# Patient Record
Sex: MALE | Race: WHITE | Employment: PART TIME | URBAN - METROPOLITAN AREA
[De-identification: names, ages, dates, MRNs, and addresses within clinical notes are randomized per-mention and may not be internally consistent; named-entity substitution may affect disease eponyms.]

---
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
## 2024-01-03 ENCOUNTER — TELEPHONE (OUTPATIENT)
Dept: PEDIATRICS | Facility: CLINIC | Age: 19
End: 2024-01-03
Payer: COMMERCIAL

## 2024-01-03 NOTE — TELEPHONE ENCOUNTER
Called Lenin and left message re: Reminder of appointments on 1/4/24.  Left direct call back number for questions or concerns.

## 2024-01-04 ENCOUNTER — VIRTUAL VISIT (OUTPATIENT)
Dept: PEDIATRICS | Facility: CLINIC | Age: 19
End: 2024-01-04
Attending: PEDIATRICS
Payer: COMMERCIAL

## 2024-01-04 DIAGNOSIS — E66.01 SEVERE OBESITY (H): Primary | ICD-10-CM

## 2024-01-04 DIAGNOSIS — E11.9 TYPE 2 DIABETES MELLITUS WITHOUT COMPLICATION, WITHOUT LONG-TERM CURRENT USE OF INSULIN (H): ICD-10-CM

## 2024-01-04 PROCEDURE — 97803 MED NUTRITION INDIV SUBSEQ: CPT | Mod: GT,95 | Performed by: DIETITIAN, REGISTERED

## 2024-01-04 NOTE — PATIENT INSTRUCTIONS
Goals  1) Continue with physical activity (weight training/walking) when you return to college.  2) Continue to prioritize protein with meals. Increase fruit/veg to feel full rather than refined grains.   3) Continue to limit sugary drinks. Aim for 64 fluids per day and separate fluids from meals/snacks.

## 2024-01-04 NOTE — PROGRESS NOTES
"Lenin is a 18 year old who is being evaluated via a billable video visit.      How would you like to obtain your AVS? Mail a copy  If the video visit is dropped, the invitation should be resent by: Text to cell phone: [phone]  Will anyone else be joining your video visit? No    Video-Visit Details    Type of service:  Video Visit   Start Time: 1:11 pm  End Tme: 1:27 pm    Originating Location (pt. Location): Home    Distant Location (provider location):  On-site  Platform used for Video Visit: Well    PATIENT:  Abel Flores  :  2005  KARTHIK:  2024  Medical Nutrition Therapy  Nutrition Reassessment  Abel is a 18 year old year old male seen for 1 month follow-up in Pediatric Bariatric Clinic/Pediatric Weight Management Clinic with severe obesity and type 2 diabetes. Abel was referred by  Dr. Ana Rosa Akins  for ongoing nutrition education and counseling.    RD Visit #: 6    Anthropometrics  Age:  18 year old male   Weight:    Wt Readings from Last 4 Encounters:   23 (!) 153 kg (337 lb 4.9 oz) (>99%, Z= 3.42)*   23 (!) 158.9 kg (350 lb 5 oz) (>99%, Z= 3.52)*   23 (!) 158.9 kg (350 lb 5 oz) (>99%, Z= 3.52)*   10/05/23 (!) 160.3 kg (353 lb 6.4 oz) (>99%, Z= 3.54)*     * Growth percentiles are based on CDC (Boys, 2-20 Years) data.     Height:    Ht Readings from Last 2 Encounters:   23 1.877 m (6' 1.9\") (94%, Z= 1.55)*   23 1.878 m (6' 1.94\") (94%, Z= 1.57)*     * Growth percentiles are based on CDC (Boys, 2-20 Years) data.     Body Mass Index:  There is no height or weight on file to calculate BMI.  Body Mass Index Percentile:  No height and weight on file for this encounter.    ~328-330 pounds most recently at home. Weight fluctuates daily.      Nutrition History  Back at his family home over January term. First semester went well.     Got home for break around Dec 20th. Back to campus on .     Parents are back and forth to the cabin on weekends.     Lenin is " feeling a bit bored at family home and not working much yet. Work will be picking up soon.     Feels that eating has been inconsistent due to lack of structure/going out with friends/holidays.     Easier to get protein in while at parent's home. He will do grocery shopping on weekend. Parents subscribed to [employer] so having this for dinner.     Leftover proteins for other meals. Mostly focusing on protein. Will include vegetables for dinner.     Wake up time is highly variable. Some staying up later due to movie marathon.     Not having a lot of snacks during the day.     When with friends it changes what he's eating a bit. Feels that it changes what he eats but not the volume of food. Ex) 2 slices of pizza.     Drinking a lot of water. Drinks more at his parent's home. Highly accessible. More than 8 glasses per day. Working on  liquids from meals.     MVI and calcium can be hard to remember if he's staying at a friend's etc.      Nutritional Intakes  Breakfast:        Highly variable over winter break   Lunch:             Protein leftover from dinner  PM Snack:       None  Dinner:            Hello [employer] meals  HS Snack:       Not often   Beverages:      Water; zero sugar energy drink        Dining Out  Might occasionally have pizza with friends. 2 slices.      Activity Level  Exploring [employer] in college.    Going for walk while at parent's house over the holidays. Gets out daily. Plans to get back into strength training. Planning to go about 2-3 days per week.      Vitamin and Mineral Supplements & Medications:  Multivitamin/Mineral:  Yes  Calcium with Vitamin D:  Yes  Vitamin B12:  No    Medications/Vitamins/Minerals    Current Outpatient Medications:     CALCIUM-VITAMIN D PO, Take by mouth 2 times daily, Disp: , Rfl:     clindamycin (CLEOCIN T) 1 % external solution, , Disp: , Rfl:     fexofenadine (ALLEGRA) 180 MG tablet, Take 180 mg by mouth daily as needed PRN, Disp: , Rfl:     ibuprofen  (ADVIL/MOTRIN) 200 MG tablet, Take 200-400 mg by mouth, Disp: , Rfl:     Melatonin 10 MG CAPS, Take 10 mg by mouth nightly as needed, Disp: , Rfl:     multivitamin w/minerals (MULTI-VITAMIN) tablet, Take 1 tablet by mouth 2 times daily, Disp: , Rfl:     Sharps Container MISC, 1 Container daily, Disp: 1 each, Rfl: 3    tirzepatide (MOUNJARO) 15 MG/0.5ML pen, Inject 15 mg Subcutaneous every 7 days, Disp: 2 mL, Rfl: 1    Previous Goals & Progress  1) Try to increase fluids to 64 oz per day. - goal met  2) Increase to two MVI and two calcium per day. - goal met  3) Try to increase produce if you're feeling more hungry at a meal rather than muffins, chips, fries. - goal met    Nutrition Diagnosis  Obesity related to excessive energy intake as evidenced by BMI/age >95th %ile    Interventions & Education  Provided written and verbal education on the following:    Consume 3 or 4 meals a day  Eat slowly/Chew Foods Well  64 oz Fluid/day  Sip fluids/avoid straws/ separate from meals  Eat Protein first    Goals  1) Continue with physical activity (weight training/walking) when you return to college.  2) Continue to prioritize protein with meals. Increase fruit/veg to feel full rather than refined grains.   3) Continue to limit sugary drinks. Aim for 64 fluids per day and separate fluids from meals/snacks.     Monitoring/Evaluation  Will continue to monitor progress towards goals and provide education in Pediatric Weight Management.    Spent 16 minutes in consult with patient.

## 2024-01-04 NOTE — LETTER
"2024      RE: Abel Flores  [address]  [place] [address]     Dear Colleague,    Thank you for the opportunity to participate in the care of your patient, Abel Flores, at the [facility] at [facility]. Please see a copy of my visit note below.    Lenin is a 18 year old who is being evaluated via a billable video visit.      How would you like to obtain your AVS? Mail a copy  If the video visit is dropped, the invitation should be resent by: Text to cell phone: [phone]  Will anyone else be joining your video visit? No    Video-Visit Details    Type of service:  Video Visit   Start Time: 1:11 pm  End Tme: 1:27 pm    Originating Location (pt. Location): Home    Distant Location (provider location):  On-site  Platform used for Video Visit: Riley    PATIENT:  Abel Flores  :  2005  KARTHIK:  2024  Medical Nutrition Therapy  Nutrition Reassessment  Abel is a 18 year old year old male seen for 1 month follow-up in Pediatric Bariatric Clinic/Pediatric Weight Management Clinic with severe obesity and type 2 diabetes. Abel was referred by  Dr. Ana Rosa Akins  for ongoing nutrition education and counseling.    RD Visit #: 6    Anthropometrics  Age:  18 year old male   Weight:    Wt Readings from Last 4 Encounters:   23 (!) 153 kg (337 lb 4.9 oz) (>99%, Z= 3.42)*   23 (!) 158.9 kg (350 lb 5 oz) (>99%, Z= 3.52)*   23 (!) 158.9 kg (350 lb 5 oz) (>99%, Z= 3.52)*   10/05/23 (!) 160.3 kg (353 lb 6.4 oz) (>99%, Z= 3.54)*     * Growth percentiles are based on CDC (Boys, 2-20 Years) data.     Height:    Ht Readings from Last 2 Encounters:   23 1.877 m (6' 1.9\") (94%, Z= 1.55)*   23 1.878 m (6' 1.94\") (94%, Z= 1.57)*     * Growth percentiles are based on CDC (Boys, 2-20 Years) data.     Body Mass Index:  There is no height or weight on file to calculate BMI.  Body Mass Index " Percentile:  No height and weight on file for this encounter.    ~328-330 pounds most recently at home. Weight fluctuates daily.      Nutrition History  Back at his family home over January term. First semester went well.     Got home for break around Dec 20th. Back to campus on Jan 20th.     Parents are back and forth to the cabin on weekends.     Lenin is feeling a bit bored at family home and not working much yet. Work will be picking up soon.     Feels that eating has been inconsistent due to lack of structure/going out with friends/holidays.     Easier to get protein in while at parent's home. He will do grocery shopping on weekend. Parents subscribed to [employer] so having this for dinner.     Leftover proteins for other meals. Mostly focusing on protein. Will include vegetables for dinner.     Wake up time is highly variable. Some staying up later due to movie marathon.     Not having a lot of snacks during the day.     When with friends it changes what he's eating a bit. Feels that it changes what he eats but not the volume of food. Ex) 2 slices of pizza.     Drinking a lot of water. Drinks more at his parent's home. Highly accessible. More than 8 glasses per day. Working on  liquids from meals.     MVI and calcium can be hard to remember if he's staying at a friend's etc.      Nutritional Intakes  Breakfast:        Highly variable over winter break   Lunch:             Protein leftover from dinner  PM Snack:       None  Dinner:            [employer] meals  HS Snack:       Not often   Beverages:      Water; zero sugar energy drink        Dining Out  Might occasionally have pizza with friends. 2 slices.      Activity Level  Exploring [employer] in college.    Going for walk while at parent's house over the holidays. Gets out daily. Plans to get back into strength training. Planning to go about 2-3 days per week.      Vitamin and Mineral Supplements & Medications:  Multivitamin/Mineral:   Yes  Calcium with Vitamin D:  Yes  Vitamin B12:  No    Medications/Vitamins/Minerals    Current Outpatient Medications:      CALCIUM-VITAMIN D PO, Take by mouth 2 times daily, Disp: , Rfl:      clindamycin (CLEOCIN T) 1 % external solution, , Disp: , Rfl:      fexofenadine (ALLEGRA) 180 MG tablet, Take 180 mg by mouth daily as needed PRN, Disp: , Rfl:      ibuprofen (ADVIL/MOTRIN) 200 MG tablet, Take 200-400 mg by mouth, Disp: , Rfl:      Melatonin 10 MG CAPS, Take 10 mg by mouth nightly as needed, Disp: , Rfl:      multivitamin w/minerals (MULTI-VITAMIN) tablet, Take 1 tablet by mouth 2 times daily, Disp: , Rfl:      Sharps Container MISC, 1 Container daily, Disp: 1 each, Rfl: 3     tirzepatide (MOUNJARO) 15 MG/0.5ML pen, Inject 15 mg Subcutaneous every 7 days, Disp: 2 mL, Rfl: 1    Previous Goals & Progress  1) Try to increase fluids to 64 oz per day. - goal met  2) Increase to two MVI and two calcium per day. - goal met  3) Try to increase produce if you're feeling more hungry at a meal rather than muffins, chips, fries. - goal met    Nutrition Diagnosis  Obesity related to excessive energy intake as evidenced by BMI/age >95th %ile    Interventions & Education  Provided written and verbal education on the following:    Consume 3 or 4 meals a day  Eat slowly/Chew Foods Well  64 oz Fluid/day  Sip fluids/avoid straws/ separate from meals  Eat Protein first    Goals  1) Continue with physical activity (weight training/walking) when you return to college.  2) Continue to prioritize protein with meals. Increase fruit/veg to feel full rather than refined grains.   3) Continue to limit sugary drinks. Aim for 64 fluids per day and separate fluids from meals/snacks.     Monitoring/Evaluation  Will continue to monitor progress towards goals and provide education in Pediatric Weight Management.    Spent 16 minutes in consult with patient.            Please do not hesitate to contact me if you have any questions/concerns.      Sincerely,       Amena Madera RD

## 2024-01-23 ENCOUNTER — TELEPHONE (OUTPATIENT)
Dept: PEDIATRICS | Facility: CLINIC | Age: 19
End: 2024-01-23
Payer: COMMERCIAL

## 2024-01-23 DIAGNOSIS — K76.0 HEPATIC STEATOSIS: ICD-10-CM

## 2024-01-23 DIAGNOSIS — R74.01 ELEVATED ALT MEASUREMENT: ICD-10-CM

## 2024-01-23 DIAGNOSIS — E66.01 SEVERE OBESITY (H): ICD-10-CM

## 2024-01-23 DIAGNOSIS — E11.9 TYPE 2 DIABETES MELLITUS WITHOUT COMPLICATION, WITHOUT LONG-TERM CURRENT USE OF INSULIN (H): ICD-10-CM

## 2024-01-23 RX ORDER — TIRZEPATIDE 15 MG/.5ML
15 INJECTION, SOLUTION SUBCUTANEOUS
Qty: 2 ML | Refills: 1 | Status: SHIPPED | OUTPATIENT
Start: 2024-01-23 | End: 2024-05-06

## 2024-01-23 NOTE — TELEPHONE ENCOUNTER
[facility] Call Center    Phone Message    May a detailed message be left on voicemail: yes     Reason for Call: Medication Refill Request      Has the patient contacted the pharmacy for the refill? Yes     Name of medication being requested: Mounjaro  tirzepatide (MOUNJARO) 15 MG/0.5ML pen      Provider who prescribed the medication: Mariya Kruse MD      Pharmacy: [facility]/pharmacy #[id number] - [address] (Ph: [phone])      Date medication is needed: 01/25/24       Action Taken: Other: PEDS WM    Travel Screening: Not Applicable

## 2024-02-05 PROBLEM — R29.898 WEAKNESS OF BOTH LOWER EXTREMITIES: Status: ACTIVE | Noted: 2024-02-05

## 2024-02-05 PROBLEM — R53.81 PHYSICAL DECONDITIONING: Status: ACTIVE | Noted: 2024-02-05

## 2024-02-06 ENCOUNTER — TELEPHONE (OUTPATIENT)
Dept: PEDIATRICS | Facility: CLINIC | Age: 19
End: 2024-02-06
Payer: COMMERCIAL

## 2024-02-06 NOTE — PROGRESS NOTES
Date: 2024    PATIENT:  Abel Flores  :          2005  KARTHIK:          2024    Dear Carrol Hitchcock:    I had the pleasure of seeing your patient, Abel Flores, for a visit in the [facility] Pediatric Weight Management Clinic on 2024 at the [facility]. Please see below for my assessment and plan of care.    Intercurrent History:  As you may recall, Lenin is a 18 year old male with a BMI in the severe obesity range (defined as BMI >/ 35 kg/m2) complicated by type 2 diabetes, hepatic steatosis, and elevated BP. Lenin has been previously seen in our Pediatric Weight Management Clinic and is now seeking consultation in our Adolescent Metabolic and Bariatric Surgery Clinic. Lenin has been progressing through our adolescent program and would like to have surgery done in early May so he can finish up college courses but have plenty of time to recover over the summer.      Medications:  - Mounjaro 15 mg weekly - some nausea in the morning sometimes; missed 2 doses recently - noted that lowest weight was 305 lbs and then weight began to increase when off of medication; noticed an increase in appetite when off medication     Activity:  - Going to the gym 2-3x/week; Tues/Thurs - walk 3 miles in the gym or walk on trails outside; if in the gym, also doing some weight training      Social History:   - Lenin is a freshman at [employer]       - Does not drink alcohol, does not smoke tobacco or vape; not smoking THC anymore  - Planning to return home for the summer and resume work at [employer]   - Had an interview today at [employer] for job during the school year      Current Medications:  Current Outpatient Rx   Medication Sig Dispense Refill    CALCIUM-VITAMIN D PO Take by mouth 2 times daily      clindamycin (CLEOCIN T) 1 % external solution       fexofenadine (ALLEGRA) 180 MG tablet Take 180 mg by mouth daily as needed PRN      ibuprofen  "(ADVIL/MOTRIN) 200 MG tablet Take 200-400 mg by mouth      Melatonin 10 MG CAPS Take 10 mg by mouth nightly as needed      multivitamin w/minerals (MULTI-VITAMIN) tablet Take 1 tablet by mouth 2 times daily      Sharps Container MISC 1 Container daily 1 each 3    tirzepatide (MOUNJARO) 15 MG/0.5ML pen Inject 15 mg Subcutaneous every 7 days 2 mL 1       Physical Exam:    Vitals:    B/P:   BP Readings from Last 1 Encounters:   02/08/24 118/88     P:   Pulse Readings from Last 1 Encounters:   02/08/24 77       Measured Weights:  Wt Readings from Last 4 Encounters:   02/08/24 142.3 kg (313 lb 11.4 oz) (>99%, Z= 3.21)*   12/07/23 (!) 153 kg (337 lb 4.9 oz) (>99%, Z= 3.42)*   11/02/23 (!) 158.9 kg (350 lb 5 oz) (>99%, Z= 3.52)*   11/02/23 (!) 158.9 kg (350 lb 5 oz) (>99%, Z= 3.52)*     * Growth percentiles are based on CDC (Boys, 2-20 Years) data.       Height:    Ht Readings from Last 4 Encounters:   02/08/24 1.865 m (6' 1.43\") (91%, Z= 1.37)*   12/07/23 1.877 m (6' 1.9\") (94%, Z= 1.55)*   11/02/23 1.878 m (6' 1.94\") (94%, Z= 1.57)*   11/02/23 1.878 m (6' 1.94\") (94%, Z= 1.57)*     * Growth percentiles are based on CDC (Boys, 2-20 Years) data.     Body Mass Index:  Body mass index is 40.91 kg/m .    Labs:    Results for orders placed or performed in visit on 02/08/24   CBC with platelets [UUC943]     Status: Abnormal   Result Value Ref Range    WBC Count 12.7 (H) 4.0 - 11.0 10e3/uL    RBC Count 5.53 4.40 - 5.90 10e6/uL    Hemoglobin 16.0 13.3 - 17.7 g/dL    Hematocrit 49.3 40.0 - 53.0 %    MCV 89 78 - 100 fL    MCH 28.9 26.5 - 33.0 pg    MCHC 32.5 31.5 - 36.5 g/dL    RDW 13.3 10.0 - 15.0 %    Platelet Count 338 150 - 450 10e3/uL   Comprehensive metabolic panel [LAB17]     Status: Abnormal   Result Value Ref Range    Sodium 141 135 - 145 mmol/L    Potassium 4.7 3.4 - 5.3 mmol/L    Carbon Dioxide (CO2) 28 22 - 29 mmol/L    Anion Gap 10 7 - 15 mmol/L    Urea Nitrogen 13.6 6.0 - 20.0 mg/dL    Creatinine 0.99 0.67 - 1.17 " "mg/dL    GFR Estimate >90 >60 mL/min/1.73m2    Calcium 9.9 8.6 - 10.0 mg/dL    Chloride 103 98 - 107 mmol/L    Glucose 88 70 - 99 mg/dL    Alkaline Phosphatase 87 65 - 260 U/L    AST 70 (H) 0 - 35 U/L     (H) 0 - 50 U/L    Protein Total 8.0 6.4 - 8.3 g/dL    Albumin 4.5 3.5 - 5.2 g/dL    Bilirubin Total 0.8 <=1.2 mg/dL   Hemoglobin A1c [LAB90]     Status: Normal   Result Value Ref Range    Hemoglobin A1C 5.3 <5.7 %   Ferritin [LAB68]     Status: Normal   Result Value Ref Range    Ferritin 82 31 - 409 ng/mL   Protein  random urine     Status: None   Result Value Ref Range    Total Protein Urine mg/dL 18.1   mg/dL    Total Protein Urine mg/mg Creat 0.09 0.00 - 0.20 mg/mg Cr    Creatinine Urine mg/dL 199.0 mg/dL          Assessment:  Abel \"Brando" is a 18 year old with a BMI in the severe obesity range (defined as BMI >/ 35 kg/m2) complicated by type 2 diabetes, hepatic steatosis, and elevated BP. Lenin is now undergoing evaluation for metabolic and bariatric surgery. Lenin is continuing to progress through the metabolic and bariatric surgery program. Lenin continues to progress well through our bariatric program and is now at his pre-surgery weight loss goal. During today's visit, he demonstrated excellent understanding of the bariatric surgery process. We will plan to move forward with getting insurance approval for surgery with a goal of having the procedure done in early May. Labs today show excellent control of diabetes with a Hgb A1c in the normal range at 5.3%. ALT continues to improve.      Abel s current problem list reviewed today includes:    Encounter Diagnoses   Name Primary?    Bariatric surgery status Yes    Type 2 diabetes mellitus without complication, without long-term current use of insulin (H)     Hepatic steatosis     Severe obesity (H)           Care Plan:  Severe Obesity: BMI 40.91 kg/m2   - Bariatric clinic visit #6   - RD and psychology appointments today as part of bariatric clinic   - " Pre-surgery weight loss goal: 340 lbs; weight today: 313 lbs   - Pharmacotherapy: continue Mounjaro 15 mg weekly      - Consultation with Dr. Higgins done 11/2/2023   - Screening labs - last done 1/2022; pre-op labs today     Type 2 Diabetes: Hgb A1c 5.3%; antibody testing done at [facility] - negative    - Continue weight management plan as noted above, including use of Mounjaro    - Due for eye exam - referral sent    - Proteinuria screen done today     Hepatic Steatosis: GI consultation done; ALT down-trending - recommend liver biopsy when Lenin undergoes surgery   - Continue weight management plan as noted above   - Last US: 2/15/2023      We are looking forward to seeing Lenin for a follow up pre-operative visit in 2 months.     Review of the result(s) of each unique test - labs as noted above  Prescription drug management  35 minutes spent by me on the date of the encounter doing review of test results, patient visit, documentation, and discussion with other provider(s)      Thank you for including me in the care of your patient.  Please do not hesitate to call with questions or concerns.    Sincerely,    Ana Rosa Akins MD, MS    American Board of Obesity Medicine Diplomate  Department of Pediatrics  [facility]              CC  Copy to patient  Huong Flroes Frank  [address]  [place] [address]

## 2024-02-06 NOTE — TELEPHONE ENCOUNTER
Called Lenin and left message re: Reminder of appointments on 2/8/24.  Went over new clinic location.  Left direct call back number for questions or concerns.

## 2024-02-07 ENCOUNTER — TELEPHONE (OUTPATIENT)
Dept: PEDIATRICS | Facility: CLINIC | Age: 19
End: 2024-02-07
Payer: COMMERCIAL

## 2024-02-07 NOTE — TELEPHONE ENCOUNTER
[facility] Call Center    Phone Message    May a detailed message be left on voicemail: yes     Reason for Call: Other: Dad is requesting to push back the 2/8/2024 @11:30am appt by 30 minutes. Or switch all appts to virtual.     Please call dad to discuss. Thanks!       Action Taken: Other: Peds WM     Travel Screening: Not Applicable

## 2024-02-07 NOTE — TELEPHONE ENCOUNTER
Verified with Dr. Carolina that it would be okay to have appointment at noon.  She is okay with plan.    Called dad back and left message re: Appointments can be changed for Lenin to start at noon.  Please call back if this will not work.  Left direct call back number.

## 2024-02-08 ENCOUNTER — OFFICE VISIT (OUTPATIENT)
Dept: PEDIATRICS | Facility: CLINIC | Age: 19
End: 2024-02-08
Attending: PEDIATRICS
Payer: COMMERCIAL

## 2024-02-08 ENCOUNTER — OFFICE VISIT (OUTPATIENT)
Dept: PSYCHOLOGY | Facility: CLINIC | Age: 19
End: 2024-02-08
Attending: PEDIATRICS
Payer: COMMERCIAL

## 2024-02-08 VITALS
DIASTOLIC BLOOD PRESSURE: 88 MMHG | WEIGHT: 313.71 LBS | SYSTOLIC BLOOD PRESSURE: 118 MMHG | HEIGHT: 73 IN | HEART RATE: 77 BPM | BODY MASS INDEX: 41.58 KG/M2

## 2024-02-08 DIAGNOSIS — E66.01 SEVERE OBESITY (H): Primary | ICD-10-CM

## 2024-02-08 DIAGNOSIS — E66.01 SEVERE OBESITY (H): ICD-10-CM

## 2024-02-08 DIAGNOSIS — Z98.84 BARIATRIC SURGERY STATUS: Primary | ICD-10-CM

## 2024-02-08 DIAGNOSIS — K76.0 HEPATIC STEATOSIS: ICD-10-CM

## 2024-02-08 DIAGNOSIS — E11.9 TYPE 2 DIABETES MELLITUS WITHOUT COMPLICATION, WITHOUT LONG-TERM CURRENT USE OF INSULIN (H): ICD-10-CM

## 2024-02-08 LAB
ALBUMIN MFR UR ELPH: 18.1 MG/DL
ALBUMIN SERPL BCG-MCNC: 4.5 G/DL (ref 3.5–5.2)
ALP SERPL-CCNC: 87 U/L (ref 65–260)
ALT SERPL W P-5'-P-CCNC: 114 U/L (ref 0–50)
ANION GAP SERPL CALCULATED.3IONS-SCNC: 10 MMOL/L (ref 7–15)
AST SERPL W P-5'-P-CCNC: 70 U/L (ref 0–35)
BILIRUB SERPL-MCNC: 0.8 MG/DL
BUN SERPL-MCNC: 13.6 MG/DL (ref 6–20)
CALCIUM SERPL-MCNC: 9.9 MG/DL (ref 8.6–10)
CHLORIDE SERPL-SCNC: 103 MMOL/L (ref 98–107)
CREAT SERPL-MCNC: 0.99 MG/DL (ref 0.67–1.17)
CREAT UR-MCNC: 199 MG/DL
DEPRECATED HCO3 PLAS-SCNC: 28 MMOL/L (ref 22–29)
EGFRCR SERPLBLD CKD-EPI 2021: >90 ML/MIN/1.73M2
ERYTHROCYTE [DISTWIDTH] IN BLOOD BY AUTOMATED COUNT: 13.3 % (ref 10–15)
FERRITIN SERPL-MCNC: 82 NG/ML (ref 31–409)
GLUCOSE SERPL-MCNC: 88 MG/DL (ref 70–99)
HBA1C MFR BLD: 5.3 %
HCT VFR BLD AUTO: 49.3 % (ref 40–53)
HGB BLD-MCNC: 16 G/DL (ref 13.3–17.7)
MCH RBC QN AUTO: 28.9 PG (ref 26.5–33)
MCHC RBC AUTO-ENTMCNC: 32.5 G/DL (ref 31.5–36.5)
MCV RBC AUTO: 89 FL (ref 78–100)
PLATELET # BLD AUTO: 338 10E3/UL (ref 150–450)
POTASSIUM SERPL-SCNC: 4.7 MMOL/L (ref 3.4–5.3)
PROT SERPL-MCNC: 8 G/DL (ref 6.4–8.3)
PROT/CREAT 24H UR: 0.09 MG/MG CR (ref 0–0.2)
RBC # BLD AUTO: 5.53 10E6/UL (ref 4.4–5.9)
SODIUM SERPL-SCNC: 141 MMOL/L (ref 135–145)
WBC # BLD AUTO: 12.7 10E3/UL (ref 4–11)

## 2024-02-08 PROCEDURE — 85014 HEMATOCRIT: CPT | Performed by: PEDIATRICS

## 2024-02-08 PROCEDURE — 83036 HEMOGLOBIN GLYCOSYLATED A1C: CPT | Performed by: PEDIATRICS

## 2024-02-08 PROCEDURE — 82728 ASSAY OF FERRITIN: CPT | Performed by: PEDIATRICS

## 2024-02-08 PROCEDURE — 99214 OFFICE O/P EST MOD 30 MIN: CPT | Performed by: PEDIATRICS

## 2024-02-08 PROCEDURE — 36415 COLL VENOUS BLD VENIPUNCTURE: CPT | Performed by: PEDIATRICS

## 2024-02-08 PROCEDURE — 96158 HLTH BHV IVNTJ INDIV 1ST 30: CPT | Performed by: PSYCHOLOGIST

## 2024-02-08 PROCEDURE — 80053 COMPREHEN METABOLIC PANEL: CPT | Performed by: PEDIATRICS

## 2024-02-08 PROCEDURE — 83970 ASSAY OF PARATHORMONE: CPT | Performed by: PEDIATRICS

## 2024-02-08 PROCEDURE — 82607 VITAMIN B-12: CPT | Performed by: PEDIATRICS

## 2024-02-08 PROCEDURE — 82306 VITAMIN D 25 HYDROXY: CPT | Performed by: PEDIATRICS

## 2024-02-08 PROCEDURE — 84156 ASSAY OF PROTEIN URINE: CPT | Performed by: PEDIATRICS

## 2024-02-08 ASSESSMENT — PAIN SCALES - GENERAL: PAINLEVEL: NO PAIN (0)

## 2024-02-08 NOTE — PROGRESS NOTES
"Pediatric Psychology Progress Note    Start time: 11:50am  Stop time: 12:20pm  Service:  [id number] - Health behavior intervention, individual, initial 30 minutes     Diagnosis:   Encounter Diagnosis   Name Primary?    Severe obesity (H) Yes         Subjective: Abel \"Lenin\" REKHA Flores is a 18 year old male who was referred as a part of the metabolic and bariatric surgery program. He presents with severe obesity (BMI >35 kg/m2) complicated by diabetes mellitus (Type 2), hepatic steatosis, and elevated BP. Preferred pronouns are he/him.     Objective: Spent session with Lenin getting updates and discussing surgery timing and plan. He is back to school for 2nd semester; no longer has a roommate. Socially he describes some improvement in getting out more. Classes are going ok, though he is falling behind in assignments with one class. Lenin had further weight  reduction today but described that weight was up a bit from a prior weight he saw, so he had mixed feelings. He was unable to get Mounjaro for a bit so that contributed to mixed feelings - I.e., knows it helps and he needs it but feels concerned with how not having it \"reverses\" progress. Stress and anxiety are reportedly manageable to him right now. He is planning on surgery in early May right when school is done; we discussed meeting on more time in April for pre-surgical session.     Assessment: Lenin was appropriately dressed and well-groomed for the appointment. Attitude was cooperative and responses to questions were meaningful and clear. Mood was positive and affect was congruent to mood. Thought process was clear and logical.     Plan: Follow up during April team visit.     Patrizia Carolina, PhD, LP, BCBA-D    of Pediatrics   Board Certified Behavior Analyst-Doctoral   Department of Pediatrics   *no letter     The author of this note documented a reason for not sharing it with the patient.    "

## 2024-02-08 NOTE — LETTER
"2024      RE: Abel Flores  [address]  [place] [address]     Dear Colleague,    Thank you for the opportunity to participate in the care of your patient, Abel Flores, at the [facility] PEDIATRIC SPECIALTY CLINIC at [facility]. Please see a copy of my visit note below.    PATIENT:  Abel Flores  :  2005  KARTHIK:  2024  Medical Nutrition Therapy    GOALS  Continue aiming for 64 ounces fluid per day. Avoid straws/carbonation/caffeine  Continue to include protein with each meal - daily goal 65 grams per day   Continue with MVI with Iron and calcium supplements (2 each daily - take MVI at a separate time from calcium to increase absorption)         Nutrition Reassessment  Abel is a 18 year old year old male who presents to Pediatric Bariatric Clinic/Pediatric Weight Management Clinic with severe obesity and type 2 diabetes. Abel was referred by Dr. Ana Rosa Akins for nutrition education and counseling, accompanied by father.    RD Visit #: 7    Anthropometrics  Wt Readings from Last 4 Encounters:   24 142.3 kg (313 lb 11.4 oz) (>99%, Z= 3.21)*   23 (!) 153 kg (337 lb 4.9 oz) (>99%, Z= 3.42)*   23 (!) 158.9 kg (350 lb 5 oz) (>99%, Z= 3.52)*   23 (!) 158.9 kg (350 lb 5 oz) (>99%, Z= 3.52)*     * Growth percentiles are based on CDC (Boys, 2-20 Years) data.     Ht Readings from Last 2 Encounters:   24 1.865 m (6' 1.43\") (91%, Z= 1.37)*   23 1.877 m (6' 1.9\") (94%, Z= 1.55)*     * Growth percentiles are based on CDC (Boys, 2-20 Years) data.     Estimated body mass index is 40.91 kg/m  as calculated from the following:    Height as of an earlier encounter on 24: 1.865 m (6' 1.43\").    Weight as of an earlier encounter on 24: 142.3 kg (313 lb 11.4 oz).    Nutrition History  Using larger water bottle. 1/2 gallon water and 1 SF energy drink. Using a lower caffeine one compared to " previous.     Breakfast - chicken sandwich. No sides in the morning. Can be more nauseous in the am.     Around noon or one. Cafeteria is open now. Has a chicken sandwich or burger. Small portion of fries. Melons/pears and broccoli and carrots. Sometimes has a slice of pizza but that's not often.     Mostly no snacking after dinner.     Other than water and SF energy drinks no other beverages.     Sleep is going pretty well. Sleeping 7-8 hours each night.     Water is between meals.    Dinner timing varies pending hunger. 6-8 pm or even later if not hungry. Feels that eating is more of a necessity versus for pleasure. Used to have more bored eating.      Nutritional Intakes  Breakfast:        Chicken sandwich   Lunch:             Chicken sandwich or burger with small amount of fries, fruit and veg  PM Snack:       None  Dinner:            Same as lunch  HS Snack:       Not often   Beverages:      Water (1/2 gallon); zero sugar energy drink (one per day at most)       Dining Out  Might occasionally have pizza with friends. 1 slice.      Activity Level  Lenin is walking more on campus.      Vitamin and Mineral Supplements & Medications:  Multivitamin/Mineral:  Yes  Calcium with Vitamin D:  Yes  Vitamin B12:  No    Previous Goals & Progress  1) Continue with physical activity (weight training/walking) when you return to college. - doing more walking  2) Continue to prioritize protein with meals. Increase fruit/veg to feel full rather than refined grains. - goal met  3) Continue to limit sugary drinks. Aim for 64 fluids per day and separate fluids from meals/snacks. - goal met    Medications/Vitamins/Minerals    Current Outpatient Medications:      CALCIUM-VITAMIN D PO, Take by mouth 2 times daily, Disp: , Rfl:      clindamycin (CLEOCIN T) 1 % external solution, , Disp: , Rfl:      fexofenadine (ALLEGRA) 180 MG tablet, Take 180 mg by mouth daily as needed PRN, Disp: , Rfl:      ibuprofen (ADVIL/MOTRIN) 200 MG tablet,  Take 200-400 mg by mouth, Disp: , Rfl:      Melatonin 10 MG CAPS, Take 10 mg by mouth nightly as needed, Disp: , Rfl:      multivitamin w/minerals (MULTI-VITAMIN) tablet, Take 1 tablet by mouth 2 times daily, Disp: , Rfl:      Sharps Container MISC, 1 Container daily, Disp: 1 each, Rfl: 3     tirzepatide (MOUNJARO) 15 MG/0.5ML pen, Inject 15 mg Subcutaneous every 7 days, Disp: 2 mL, Rfl: 1    Nutrition Diagnosis  Obesity related to excessive energy intake as evidenced by BMI/age >95th %ile    Interventions & Education  Provided written and verbal education on the following:    Healthy meals/cooking  Healthy beverages  Consume 3 or 4 meals a day  64 oz Fluid/day  Eliminate caffeinated/carbonated beverages    Monitoring/Evaluation  Will continue to monitor progress towards goals and provide education in Pediatric Weight Management.    Spent 15 minutes in consult with patient.       Please do not hesitate to contact me if you have any questions/concerns.     Sincerely,       Amena Madera RD

## 2024-02-08 NOTE — LETTER
"2/8/2024      RE: Abel Flores  [address]  [place] [address]     Dear Colleague,    Thank you for the opportunity to participate in the care of your patient, Abel Flores, at the [facility] PEDIATRIC SPECIALTY CLINIC at [facility]. Please see a copy of my visit note below.    Pediatric Psychology Progress Note    Start time: 11:50am  Stop time: 12:20pm  Service:  [id number] - Health behavior intervention, individual, initial 30 minutes     Diagnosis:   Encounter Diagnosis   Name Primary?     Severe obesity (H) Yes         Subjective: Abel \"Lenin\" REKHA Flores is a 18 year old male who was referred as a part of the metabolic and bariatric surgery program. He presents with severe obesity (BMI >35 kg/m2) complicated by diabetes mellitus (Type 2), hepatic steatosis, and elevated BP. Preferred pronouns are he/him.     Objective: Spent session with Lenin getting updates and discussing surgery timing and plan. He is back to school for 2nd semester; no longer has a roommate. Socially he describes some improvement in getting out more. Classes are going ok, though he is falling behind in assignments with one class. Lenin had further weight  reduction today but described that weight was up a bit from a prior weight he saw, so he had mixed feelings. He was unable to get Mounjaro for a bit so that contributed to mixed feelings - I.e., knows it helps and he needs it but feels concerned with how not having it \"reverses\" progress. Stress and anxiety are reportedly manageable to him right now. He is planning on surgery in early May right when school is done; we discussed meeting on more time in April for pre-surgical session.     Assessment: Lenin was appropriately dressed and well-groomed for the appointment. Attitude was cooperative and responses to questions were meaningful and clear. Mood was positive and affect was congruent to mood. Thought process was " clear and logical.     Plan: Follow up during April team visit.     Patrizia Carolina, PhD, LP, BCBA-D    of Pediatrics   Board Certified Behavior Analyst-Doctoral   Department of Pediatrics   *no letter     The author of this note documented a reason for not sharing it with the patient.      Please do not hesitate to contact me if you have any questions/concerns.     Sincerely,       Patrizia Carolina, VARSHA, PhD LP

## 2024-02-08 NOTE — LETTER
2024      RE: Abel Flores  [address]  [place] [address]     Dear Colleague,    Thank you for the opportunity to participate in the care of your patient, Abel Flores, at the [facility] PEDIATRIC SPECIALTY CLINIC at [facility]. Please see a copy of my visit note below.          Date: 2024    PATIENT:  Abel Flores  :          2005  KARTHIK:          2024    Dear Carrol Hitchcock:    I had the pleasure of seeing your patient, Abel Flores, for a visit in the [facility] Pediatric Weight Management Clinic on 2024 at the [facility]. Please see below for my assessment and plan of care.    Intercurrent History:  As you may recall, Lenin is a 18 year old male with a BMI in the severe obesity range (defined as BMI >/ 35 kg/m2) complicated by type 2 diabetes, hepatic steatosis, and elevated BP. Lenin has been previously seen in our Pediatric Weight Management Clinic and is now seeking consultation in our Adolescent Metabolic and Bariatric Surgery Clinic. Lenin has been progressing through our adolescent program and would like to have surgery done in early May so he can finish up college courses but have plenty of time to recover over the summer.      Medications:  - Mounjaro 15 mg weekly - some nausea in the morning sometimes; missed 2 doses recently - noted that lowest weight was 305 lbs and then weight began to increase when off of medication; noticed an increase in appetite when off medication     Activity:  - Going to the gym 2-3x/week; Tues/Thurs - walk 3 miles in the gym or walk on trails outside; if in the gym, also doing some weight training      Social History:   - Lenin is a freshman at [employer]       - Does not drink alcohol, does not smoke tobacco or vape; not smoking THC anymore  - Planning to return home for the summer and resume work at  "[employer]   - Had an interview today at [employer] for job during the school year      Current Medications:  Current Outpatient Rx   Medication Sig Dispense Refill     CALCIUM-VITAMIN D PO Take by mouth 2 times daily       clindamycin (CLEOCIN T) 1 % external solution        fexofenadine (ALLEGRA) 180 MG tablet Take 180 mg by mouth daily as needed PRN       ibuprofen (ADVIL/MOTRIN) 200 MG tablet Take 200-400 mg by mouth       Melatonin 10 MG CAPS Take 10 mg by mouth nightly as needed       multivitamin w/minerals (MULTI-VITAMIN) tablet Take 1 tablet by mouth 2 times daily       Sharps Container MISC 1 Container daily 1 each 3     tirzepatide (MOUNJARO) 15 MG/0.5ML pen Inject 15 mg Subcutaneous every 7 days 2 mL 1       Physical Exam:    Vitals:    B/P:   BP Readings from Last 1 Encounters:   02/08/24 118/88     P:   Pulse Readings from Last 1 Encounters:   02/08/24 77       Measured Weights:  Wt Readings from Last 4 Encounters:   02/08/24 142.3 kg (313 lb 11.4 oz) (>99%, Z= 3.21)*   12/07/23 (!) 153 kg (337 lb 4.9 oz) (>99%, Z= 3.42)*   11/02/23 (!) 158.9 kg (350 lb 5 oz) (>99%, Z= 3.52)*   11/02/23 (!) 158.9 kg (350 lb 5 oz) (>99%, Z= 3.52)*     * Growth percentiles are based on CDC (Boys, 2-20 Years) data.       Height:    Ht Readings from Last 4 Encounters:   02/08/24 1.865 m (6' 1.43\") (91%, Z= 1.37)*   12/07/23 1.877 m (6' 1.9\") (94%, Z= 1.55)*   11/02/23 1.878 m (6' 1.94\") (94%, Z= 1.57)*   11/02/23 1.878 m (6' 1.94\") (94%, Z= 1.57)*     * Growth percentiles are based on CDC (Boys, 2-20 Years) data.     Body Mass Index:  Body mass index is 40.91 kg/m .    Labs:    Results for orders placed or performed in visit on 02/08/24   CBC with platelets [EIO036]     Status: Abnormal   Result Value Ref Range    WBC Count 12.7 (H) 4.0 - 11.0 10e3/uL    RBC Count 5.53 4.40 - 5.90 10e6/uL    Hemoglobin 16.0 13.3 - 17.7 g/dL    Hematocrit 49.3 40.0 - 53.0 %    MCV 89 78 - 100 fL    MCH 28.9 26.5 - 33.0 pg    MCHC 32.5 31.5 - 36.5 " "g/dL    RDW 13.3 10.0 - 15.0 %    Platelet Count 338 150 - 450 10e3/uL   Comprehensive metabolic panel [LAB17]     Status: Abnormal   Result Value Ref Range    Sodium 141 135 - 145 mmol/L    Potassium 4.7 3.4 - 5.3 mmol/L    Carbon Dioxide (CO2) 28 22 - 29 mmol/L    Anion Gap 10 7 - 15 mmol/L    Urea Nitrogen 13.6 6.0 - 20.0 mg/dL    Creatinine 0.99 0.67 - 1.17 mg/dL    GFR Estimate >90 >60 mL/min/1.73m2    Calcium 9.9 8.6 - 10.0 mg/dL    Chloride 103 98 - 107 mmol/L    Glucose 88 70 - 99 mg/dL    Alkaline Phosphatase 87 65 - 260 U/L    AST 70 (H) 0 - 35 U/L     (H) 0 - 50 U/L    Protein Total 8.0 6.4 - 8.3 g/dL    Albumin 4.5 3.5 - 5.2 g/dL    Bilirubin Total 0.8 <=1.2 mg/dL   Hemoglobin A1c [LAB90]     Status: Normal   Result Value Ref Range    Hemoglobin A1C 5.3 <5.7 %   Ferritin [LAB68]     Status: Normal   Result Value Ref Range    Ferritin 82 31 - 409 ng/mL   Protein  random urine     Status: None   Result Value Ref Range    Total Protein Urine mg/dL 18.1   mg/dL    Total Protein Urine mg/mg Creat 0.09 0.00 - 0.20 mg/mg Cr    Creatinine Urine mg/dL 199.0 mg/dL          Assessment:  Abel \"Brando" is a 18 year old with a BMI in the severe obesity range (defined as BMI >/ 35 kg/m2) complicated by type 2 diabetes, hepatic steatosis, and elevated BP. Lenin is now undergoing evaluation for metabolic and bariatric surgery. Lenin is continuing to progress through the metabolic and bariatric surgery program. Lenin continues to progress well through our bariatric program and is now at his pre-surgery weight loss goal. During today's visit, he demonstrated excellent understanding of the bariatric surgery process. We will plan to move forward with getting insurance approval for surgery with a goal of having the procedure done in early May. Labs today show excellent control of diabetes with a Hgb A1c in the normal range at 5.3%. ALT continues to improve.      Abel s current problem list reviewed today " includes:    Encounter Diagnoses   Name Primary?     Bariatric surgery status Yes     Type 2 diabetes mellitus without complication, without long-term current use of insulin (H)      Hepatic steatosis      Severe obesity (H)           Care Plan:  Severe Obesity: BMI 40.91 kg/m2   - Bariatric clinic visit #6   - RD and psychology appointments today as part of bariatric clinic   - Pre-surgery weight loss goal: 340 lbs; weight today: 313 lbs   - Pharmacotherapy: continue Mounjaro 15 mg weekly      - Consultation with Dr. Higgins done 11/2/2023   - Screening labs - last done 1/2022; pre-op labs today     Type 2 Diabetes: Hgb A1c 5.3%; [facility]    - Continue weight management plan as noted above, including use of Mounjaro    - Due for eye exam - referral sent    - Proteinuria screen done today     Hepatic Steatosis: GI consultation done; ALT down-trending - recommend liver biopsy when Lenin undergoes surgery   - Continue weight management plan as noted above   - Last US: 2/15/2023      We are looking forward to seeing Lenin for a follow up pre-operative visit in 2 months.     Review of the result(s) of each unique test - labs as noted above  Prescription drug management  35 minutes spent by me on the date of the encounter doing review of test results, patient visit, documentation, and discussion with other provider(s)      Thank you for including me in the care of your patient.  Please do not hesitate to call with questions or concerns.    Sincerely,    Ana Rosa Akins MD, MS    American Board of Obesity Medicine Diplomate  Department of Pediatrics  [facility]              CC  Copy to patient  MarkHuongAmauri Coe  [address]  [place] [address]      Please do not hesitate to contact me if you have any questions/concerns.     Sincerely,       Ana Rosa Akins MD

## 2024-02-08 NOTE — PROGRESS NOTES
"PATIENT:  Abel Flores  :  2005  KARTHIK:  2024  Medical Nutrition Therapy    GOALS  Continue aiming for 64 ounces fluid per day. Avoid straws/carbonation/caffeine  Continue to include protein with each meal - daily goal 65 grams per day   Continue with MVI with Iron and calcium supplements (2 each daily - take MVI at a separate time from calcium to increase absorption)         Nutrition Reassessment  Abel is a 18 year old year old male who presents to Pediatric Bariatric Clinic/Pediatric Weight Management Clinic with severe obesity and type 2 diabetes. Abel was referred by Dr. Ana Rosa Akins for nutrition education and counseling, accompanied by father.    RD Visit #: 7    Anthropometrics  Wt Readings from Last 4 Encounters:   24 142.3 kg (313 lb 11.4 oz) (>99%, Z= 3.21)*   23 (!) 153 kg (337 lb 4.9 oz) (>99%, Z= 3.42)*   23 (!) 158.9 kg (350 lb 5 oz) (>99%, Z= 3.52)*   23 (!) 158.9 kg (350 lb 5 oz) (>99%, Z= 3.52)*     * Growth percentiles are based on CDC (Boys, 2-20 Years) data.     Ht Readings from Last 2 Encounters:   24 1.865 m (6' 1.43\") (91%, Z= 1.37)*   23 1.877 m (6' 1.9\") (94%, Z= 1.55)*     * Growth percentiles are based on CDC (Boys, 2-20 Years) data.     Estimated body mass index is 40.91 kg/m  as calculated from the following:    Height as of an earlier encounter on 24: 1.865 m (6' 1.43\").    Weight as of an earlier encounter on 24: 142.3 kg (313 lb 11.4 oz).    Nutrition History  Using larger water bottle. 1/2 gallon water and 1 SF energy drink. Using a lower caffeine one compared to previous.     Breakfast - chicken sandwich. No sides in the morning. Can be more nauseous in the am.     Around noon or one. Cafeteria is open now. Has a chicken sandwich or burger. Small portion of fries. Melons/pears and broccoli and carrots. Sometimes has a slice of pizza but that's not often.     Mostly no snacking after dinner.     Other than water and " SF energy drinks no other beverages.     Sleep is going pretty well. Sleeping 7-8 hours each night.     Water is between meals.    Dinner timing varies pending hunger. 6-8 pm or even later if not hungry. Feels that eating is more of a necessity versus for pleasure. Used to have more bored eating.      Nutritional Intakes  Breakfast:        Chicken sandwich   Lunch:             Chicken sandwich or burger with small amount of fries, fruit and veg  PM Snack:       None  Dinner:            Same as lunch  HS Snack:       Not often   Beverages:      Water (1/2 gallon); zero sugar energy drink (one per day at most)       Dining Out  Might occasionally have pizza with friends. 1 slice.      Activity Level  Lenin is walking more on campus.      Vitamin and Mineral Supplements & Medications:  Multivitamin/Mineral:  Yes  Calcium with Vitamin D:  Yes  Vitamin B12:  No    Previous Goals & Progress  1) Continue with physical activity (weight training/walking) when you return to college. - doing more walking  2) Continue to prioritize protein with meals. Increase fruit/veg to feel full rather than refined grains. - goal met  3) Continue to limit sugary drinks. Aim for 64 fluids per day and separate fluids from meals/snacks. - goal met    Medications/Vitamins/Minerals    Current Outpatient Medications:     CALCIUM-VITAMIN D PO, Take by mouth 2 times daily, Disp: , Rfl:     clindamycin (CLEOCIN T) 1 % external solution, , Disp: , Rfl:     fexofenadine (ALLEGRA) 180 MG tablet, Take 180 mg by mouth daily as needed PRN, Disp: , Rfl:     ibuprofen (ADVIL/MOTRIN) 200 MG tablet, Take 200-400 mg by mouth, Disp: , Rfl:     Melatonin 10 MG CAPS, Take 10 mg by mouth nightly as needed, Disp: , Rfl:     multivitamin w/minerals (MULTI-VITAMIN) tablet, Take 1 tablet by mouth 2 times daily, Disp: , Rfl:     Sharps Container MISC, 1 Container daily, Disp: 1 each, Rfl: 3    tirzepatide (MOUNJARO) 15 MG/0.5ML pen, Inject 15 mg Subcutaneous every 7  days, Disp: 2 mL, Rfl: 1    Nutrition Diagnosis  Obesity related to excessive energy intake as evidenced by BMI/age >95th %ile    Interventions & Education  Provided written and verbal education on the following:    Healthy meals/cooking  Healthy beverages  Consume 3 or 4 meals a day  64 oz Fluid/day  Eliminate caffeinated/carbonated beverages    Monitoring/Evaluation  Will continue to monitor progress towards goals and provide education in Pediatric Weight Management.    Spent 15 minutes in consult with patient.

## 2024-02-08 NOTE — NURSING NOTE
"[facility] [[id number]]  Chief Complaint   Patient presents with    RECHECK     Initial /88   Pulse 77   Ht 6' 1.43\" (186.5 cm)   Wt 313 lb 11.4 oz (142.3 kg)   BMI 40.91 kg/m   Estimated body mass index is 40.91 kg/m  as calculated from the following:    Height as of this encounter: 6' 1.43\" (186.5 cm).    Weight as of this encounter: 313 lb 11.4 oz (142.3 kg).  Medication Reconciliation: complete    Does the patient need any medication refills today? No    Does the patient/parent need MyChart or Proxy acces today? No    Does the patient want a flu shot today? No          "

## 2024-02-09 LAB
PTH-INTACT SERPL-MCNC: 46 PG/ML (ref 15–65)
VIT B12 SERPL-MCNC: 371 PG/ML (ref 232–1245)
VIT D+METAB SERPL-MCNC: 17 NG/ML (ref 20–50)

## 2024-02-19 ENCOUNTER — TELEPHONE (OUTPATIENT)
Dept: ENDOCRINOLOGY | Facility: CLINIC | Age: 19
End: 2024-02-19
Payer: COMMERCIAL

## 2024-02-19 NOTE — TELEPHONE ENCOUNTER
Called patient to discuss surgery date for a sleeve with Dr. Higgins. Lenin's father picked up the phone. Lenin said he wanted the surgery as early in May as possible, has finals the first week of May. Decided on May 14 for the sleeve. PAC appointment made.

## 2024-02-20 NOTE — TELEPHONE ENCOUNTER
FUTURE VISIT INFORMATION      SURGERY INFORMATION:  Date: TBD- sleeve with Dr. Higgins     RECORDS REQUESTED FROM:       Primary Care Provider: Amena Donis MD  - Ade

## 2024-02-21 ENCOUNTER — DOCUMENTATION ONLY (OUTPATIENT)
Dept: PSYCHOLOGY | Facility: CLINIC | Age: 19
End: 2024-02-21
Payer: COMMERCIAL

## 2024-02-21 DIAGNOSIS — E66.01 SEVERE OBESITY (H): Primary | ICD-10-CM

## 2024-02-21 PROCEDURE — 99207 PR NO CHARGE LOS: CPT | Performed by: PSYCHOLOGIST

## 2024-02-21 NOTE — PROGRESS NOTES
Pediatric Psychology Contact Note      To whom it may concern:    Abel Flores is a 18-year-old male (He/Him), who was referred for psychological services as part of metabolic and bariatric surgery program. He presents with severe obesity (BMI >35 kg/m2) complicated by diabetes mellitus (Type 2), hepatic steatosis, and elevated BP. The following is a preoperative psychological evaluation.    Reason for Pursuing Surgery: Lenin is currently considering metabolic and bariatric surgery as a means to control his weight status. He reported that he made this decision after learning more about the weight-loss process and being told he could weigh under 300lbs. He acknowledged that initially he was not considering surgery due to pride. However, as he continues to develop medical comorbidities along with making lifestyle changes and acquiring new knowledge of weight. He has been more accepting and knowledgeable about surgery.     History of Present Illness: Lenin has a BMI in the severely obese range (defined as BMI >/35kg/m2) complicated by type 2 diabetes, hepatic steatosis, and elevated BP. He has tried a variety of strategies including medication management. Lenin endorsed the following eating behaviors with the weight management team in January 2022: eats when bored, eats to cope with negative emotions, eats large amounts when not hungry, overeats in evening hours (doing this less now), grazes and gets second portions at meals. At the intake, primary contributors to Lenin's weight status included: strong hunger which may be due to a disorder in satiety regulation, mental health barriers, specifically depression or anxiety, diabetes and changes in eating/activity patterns in the context of the COVID-19 pandemic.      Family and Social History: Lenin currently lives on campus at [facility] with a roommate. However, when he is not in school, he lives with his mother and father in [place]. He also has two older  sisters. Lenin described his father as supportive, but noted that historically, he has been a less authoritative parent. He described his relationship with his mother as strenuous because they are often annoyed and bickering with each other. Socially, Lenin reported having several close friends and enjoying exploring his new environment, playing video games, and pursuing hobbies. He described struggling with being social with other people, and often having extroverted friends help introduce him to others. Lenin is not in a romantic relationship.     School/Employment History: This fall, Lenin began his first year at [employer]. He is majoring in professional communications and emerging media. He reported that as he begins college, he intends to change his academic habits. Historically, Lenin described himself to be a slacker. He noted that he performed well in the classroom. However, he struggled to complete homework. Lenin attributed his habits to not wanting to complete the hard work. As a result of his efforts, Lenin was able to pass high school earning C's and D's.      Birth/Developmental History: Lenin's birth and developmental history are largely unknown.     Medical/Psychiatric History: In addition to carrying extra weight, Lenin is diagnosed with type 2 diabetes, hepatic steatosis, and elevated BP. Lenin's current medications include fexofenadine and tirzepatide.      In terms of mental health treatment, Lenin has a history of engaging in psychotherapy during his etelvina and senior year of high school. He reported a history of symptoms of depression that included passive suicidal ideation (i.e., what if I did not exist). Lenin denied current suicidal ideation, or a history of plans or attempts. During therapy, Lenin reported learning coping strategies to help him manage stressful situations. In addition to therapy, Lenin was previously prescribed Wellbutrin. Lenin stopped taking his Wellbutrin after therapy and continues to  not take it, as he feels as though he has the skills to cope with his challenges.     Substance Abuse: Lenin reported a history of using marijuana socially. However, he reported that he has stopped using marijuana. Lenin denied use of other substances.     Legal History: Lenin denied current legal involvement.     EVALUATION DATA AND FINDINGS (completed in Sept 2023)  Mental Status: Lenin was appropriately dressed and well-groomed for the appointment. Attitude was cooperative and responses to questions were meaningful and clear. Moreover, they were elaborative and detailed. Lenin was oriented to time, place, person, and situation. Mood was positive and affect was congruent to mood. Thought process was clear and logical    Behavioral and Emotional Functioning  Behavior Assessment System for Children, Third Edition, Self-Report (BASC-3)  The BASC-3 asks parents and youth to rate the frequency and intensity of problem behaviors, as well as adaptive behaviors. T-Scores on the BASC-3 have an average of 50 with a standard deviation of 10 (the average range is 40 to 59). T-Scores ranging from 60-69 are considered  at risk  (mild to moderate symptoms) and T-Scores >70 are considered to be  clinically significant  (severe symptoms). On the BASC-2 adaptive scales, T-scores 40-31 are considered  at risk  (mild to moderate impairment) and T-Scores < 30 are considered to be  clinically significant  (severe impairment).     Parent Report  Clinical Scales Parent T-Score Score Range   Hyperactivity 40 Average   Aggression 41 Average   Conduct Problems  42 Average   Anxiety 39 Average   Depression 42 Average   Somatization 44 Average   Attention Problems 51 Average   Atypicality 41 Average   Withdrawal 41 Average           Adaptive Scales       Adaptability 63 Average   Social Skills 58 Average   Leadership 55 At-Risk   Functional Communication 60 At-Risk   Activities of Daily Living 48 Average           Composite Indices        Externalizing Problems 41 Average   Internalizing Problems 45 Average   Behavioral Symptoms Index 42 Average   Adaptive Skills 58 Average      Self-Report  Clinical Scales Self T-Score Score Range   Attitude to School 64 At- Risk   Attitude to Teachers 61 At- Risk   Sensation Seeking 48 Average   Atypicality 49 Average   Locus of Control 45 Average   Social Stress 46 Average   Anxiety 42 Average   Depression 49 Average   Sense of Inadequacy 56 Average   Somatization 45 Average   Attention Problems 44 Average   Hyperactivity 42 Average           Adaptive Scales       Relations with Parents 41 Average   Interpersonal Relations 57 Average   Self-Esteem 32 At-Risk   Self-Reliance 44 Average           Composite Indices       School Problems 60 At- Risk   Internalizing Problems 47 Average   Inattention/Hyperactivity 42 Average   Emotional Symptoms Index 54 Average   Personal Adjustment 47 Average     Lenin and his father completed the BASC-3. His father did not report concerns about Lenin's emotional and behavioral functioning. Lenin reported at risk scores regarding school problems (i.e., attitude toward teachers, attitude toward school), and his self-esteem.     Depression and Anxiety:  The Patient Health Questionnaire (PHQ-9A)  The PHQ-9A assesses symptoms of depression. This scale consists of 9 items, each of which is scored 0 to 3. Scores for each symptom are totaled, and compared to the following ranges: 0-4 (none or minimal depression), 5-9 (mild), 10-14 (moderate), 15-19 (moderately severe), and 20-27 (severe) depression.       Youth Report Classification   Total Score 6 Mild      Lenin reported 6 depression symptoms. He reported difficulty sleeping for more than half of the days in the last two weeks. In addition, he reported several days of little interest/pleasure in activities, feeling down or depressed, feeling tired or having little energy, and having trouble concentrating.      General Anxiety Disorder-7  (SHAKIRA-7)  The SHAKIRA-7 assesses symptoms of generalized anxiety disorder. The scale includes 7 items, each of which is rated 0 to 3. Scores for each item are totaled and compared to the following ranges: 0-4 (none or minimal anxiety), 5-9 (mild), 10-14 (moderate), 15-21 (severe).       Youth Report Classification   Total Score 1 Minimal   Lenin reported one symptom of anxiety. Specifically, he reported becoming easily annoyed or irritable.      Eating Behaviors:  Adult Eating Behavior Questionnaire (AEBQ)  The AEBQ is a self-report measure of eating behaviors across food approach scales and food avoidant scales. Reponses to each item are reported as 1-5, and mean scores for each scale are provided below, with numbers closer to 5 indicating more of the identified trait.     Domain Mean Scores   Food Responsiveness 4   Emotional Overeating 3   Enjoyment of Food 5   Satiety Responsiveness 1   Slowness in Eating 1.25   Emotional Undereating 2.2   Food Fussiness 1.6   Hunger 1.8      Substance Use:   Lenin completed the CRAFFT+N, a measure of substance use for adolescents and young adults. Lenin endorsed using marijuana for 100 days in the last year. He endorsed riding a car when substances were used. In addition, he reported using substances to relax and when alone. In follow-up conversation, he reported having not used marijuana since told by Dr. Akins that this was incompatible with surgery.     Adverse Childhood Experiences:   Lenin completed the [facility] Adverse Childhood Experiences Questionnaire (ACE-Q), a self-report measure of childhood adverse or traumatic experiences. Jacob endorsed one experience.     Conclusions:   Lenin has met the expectations of our pre-surgery program. He understands the risks and benefits of surgery. He is a good surgery candidate and we approve him to move forward with bariatric surgery.      Patrizia Carolina, PhD, LP, BCBA-D    of Pediatrics   Board Certified Behavior  Analyst-Doctoral   Department of Pediatrics     The author of this note documented a reason for not sharing it with the patient.       *no letter

## 2024-02-29 ENCOUNTER — PREP FOR PROCEDURE (OUTPATIENT)
Dept: ENDOCRINOLOGY | Facility: CLINIC | Age: 19
End: 2024-02-29
Payer: COMMERCIAL

## 2024-02-29 DIAGNOSIS — E66.01 MORBID OBESITY (H): Primary | ICD-10-CM

## 2024-02-29 RX ORDER — CEFAZOLIN SODIUM IN 0.9 % NACL 3 G/100 ML
3 INTRAVENOUS SOLUTION, PIGGYBACK (ML) INTRAVENOUS
Status: CANCELLED | OUTPATIENT
Start: 2024-02-29

## 2024-02-29 RX ORDER — CEFAZOLIN SODIUM IN 0.9 % NACL 3 G/100 ML
3 INTRAVENOUS SOLUTION, PIGGYBACK (ML) INTRAVENOUS SEE ADMIN INSTRUCTIONS
Status: CANCELLED | OUTPATIENT
Start: 2024-02-29

## 2024-02-29 RX ORDER — ACETAMINOPHEN 325 MG/1
975 TABLET ORAL ONCE
Status: CANCELLED | OUTPATIENT
Start: 2024-02-29 | End: 2024-02-29

## 2024-02-29 RX ORDER — ENOXAPARIN SODIUM 100 MG/ML
40 INJECTION SUBCUTANEOUS
Status: CANCELLED | OUTPATIENT
Start: 2024-02-29

## 2024-02-29 RX ORDER — ONDANSETRON 2 MG/ML
4 INJECTION INTRAMUSCULAR; INTRAVENOUS
Status: CANCELLED | OUTPATIENT
Start: 2024-02-29

## 2024-04-09 ENCOUNTER — TELEPHONE (OUTPATIENT)
Dept: PEDIATRICS | Facility: CLINIC | Age: 19
End: 2024-04-09
Payer: COMMERCIAL

## 2024-04-09 NOTE — TELEPHONE ENCOUNTER
Called and spoke to Lenin.  Confirmed appointments on 4/11/24.  Went over the providers Lenin would be seeing.  Lenin had no other questions at this time.

## 2024-04-11 ENCOUNTER — OFFICE VISIT (OUTPATIENT)
Dept: PEDIATRICS | Facility: CLINIC | Age: 19
End: 2024-04-11
Attending: PEDIATRICS
Payer: COMMERCIAL

## 2024-04-11 ENCOUNTER — ALLIED HEALTH/NURSE VISIT (OUTPATIENT)
Dept: NURSING | Facility: CLINIC | Age: 19
End: 2024-04-11
Attending: PEDIATRICS
Payer: COMMERCIAL

## 2024-04-11 VITALS — BODY MASS INDEX: 37.95 KG/M2 | HEIGHT: 74 IN | WEIGHT: 295.7 LBS

## 2024-04-11 DIAGNOSIS — E66.01 SEVERE OBESITY (H): Primary | ICD-10-CM

## 2024-04-11 DIAGNOSIS — E11.9 TYPE 2 DIABETES MELLITUS WITHOUT COMPLICATION, WITHOUT LONG-TERM CURRENT USE OF INSULIN (H): ICD-10-CM

## 2024-04-11 DIAGNOSIS — Z98.84 BARIATRIC SURGERY STATUS: Primary | ICD-10-CM

## 2024-04-11 PROCEDURE — 97803 MED NUTRITION INDIV SUBSEQ: CPT | Performed by: DIETITIAN, REGISTERED

## 2024-04-11 PROCEDURE — 96158 HLTH BHV IVNTJ INDIV 1ST 30: CPT | Performed by: PSYCHOLOGIST

## 2024-04-11 PROCEDURE — 99207 PR NO CHARGE LOS: CPT | Performed by: PSYCHOLOGIST

## 2024-04-11 NOTE — PROGRESS NOTES
"PATIENT:  Abel Flores  :  2005  KARTHIK:  2024  Medical Nutrition Therapy    GOALS  Continue with goal of 65 grams protein per day. After surgery, start with protein and then work toward vegetables/fruit/grains   Eat at least 3-4 times per day to ensure adequate protein intakes   Post-op fluid goal 64 oz. Make sure you are waiting at least 20 minutes after eating before drinking fluids. Avoid caffeine, straws, carbonation  Continue with full liquid diet after discharge until 14 days post-surgery then advance to pureed diet x 2 weeks.          Nutrition Reassessment  Abel is a 18 year old year old male who [facility] with severe obesity and type 2 diabetes. Abel was referred by Dr. Ana Rosa Akins for nutrition education and counseling, accompanied by father.    RD Visit #: 8    Anthropometrics  Wt Readings from Last 4 Encounters:   24 134.1 kg (295 lb 11.2 oz) (>99%, Z= 3.02)*   24 142.3 kg (313 lb 11.4 oz) (>99%, Z= 3.21)*   23 (!) 153 kg (337 lb 4.9 oz) (>99%, Z= 3.42)*   23 (!) 158.9 kg (350 lb 5 oz) (>99%, Z= 3.52)*     * Growth percentiles are based on CDC (Boys, 2-20 Years) data.     Ht Readings from Last 2 Encounters:   24 1.876 m (6' 1.86\") (94%, Z= 1.52)*   24 1.865 m (6' 1.43\") (91%, Z= 1.37)*     * Growth percentiles are based on CDC (Boys, 2-20 Years) data.     Estimated body mass index is 40.91 kg/m  as calculated from the following:    Height as of 24: 1.865 m (6' 1.43\").    Weight as of 24: 142.3 kg (313 lb 11.4 oz).    Nutrition History  Off Mounjaro x 2 weeks. Feels that he's still losing weight dur to busy schedule. Has gained a little bit in the last few weeks. Low weight at home 290 pounds.     Noticing hunger more but less control over him.     Still wanting to have surgery.     Walking a lot around campus. [place] Feels that it's easier going up and down stairs, up a " gee, walking.     Wake up and has first class before breakfast. Drinks water before breakfast.     Breakfast which is chicken sandwich/fries/fruit or vegetable side if interested.     Next class.     [employer] for home work.     Another class.     Dinner - same as first meal.     Sometimes has work in the evening. [employer] in [place]. Doesn't eat often. 4 chicken tenders. Water on shift.     No snacks after dinner.     No protein shakes. After surgery they bought the [employer] Nutrition Plan. Muscle Milk protein powder. Going to purchase unflavored protein powder.     Ashwaganda - for stress relief. This is helping. Melotonin at night. MVI (2)  and Calcium (2).     Water 64-80 oz fluids. SF monster.     Previous Goals & Progress  Continue aiming for 64 ounces fluid per day. Avoid straws/carbonation/caffeine - goal met  Continue to include protein with each meal - daily goal 65 grams per day - ongoing goal   Continue with MVI with Iron and calcium supplements (2 each daily - take MVI at a separate time from calcium to increase absorption) - goal met    Medications/Vitamins/Minerals    Current Outpatient Medications:     CALCIUM-VITAMIN D PO, Take by mouth 2 times daily, Disp: , Rfl:     clindamycin (CLEOCIN T) 1 % external solution, , Disp: , Rfl:     fexofenadine (ALLEGRA) 180 MG tablet, Take 180 mg by mouth daily as needed PRN, Disp: , Rfl:     ibuprofen (ADVIL/MOTRIN) 200 MG tablet, Take 200-400 mg by mouth, Disp: , Rfl:     Melatonin 10 MG CAPS, Take 10 mg by mouth nightly as needed, Disp: , Rfl:     multivitamin w/minerals (MULTI-VITAMIN) tablet, Take 1 tablet by mouth 2 times daily, Disp: , Rfl:     Sharps Container MISC, 1 Container daily, Disp: 1 each, Rfl: 3    tirzepatide (MOUNJARO) 15 MG/0.5ML pen, Inject 15 mg Subcutaneous every 7 days, Disp: 2 mL, Rfl: 1    Nutrition Diagnosis  Obesity related to excessive energy intake as evidenced by BMI/age >95th %ile    Interventions & Education  Provided written and  verbal education on the following:    Healthy meals/cooking  Healthy snacks  Healthy beverages  Portion sizes  Increase fruit and vegetable intake  64 oz Fluid/day  Sip fluids/avoid straws/ separate from meals  Eliminate caffeinated/carbonated beverages  Eat Protein first  Post-Op Diet Plan    Monitoring/Evaluation  Will continue to monitor progress towards goals and provide education in Pediatric Weight Management.    Spent 30 minutes in consult with patient & father.

## 2024-04-11 NOTE — PROGRESS NOTES
"Pediatric Psychology Progress Note    Start time: 11:30am  Stop time: 12:00pm  Service:  [id number] - Health behavior intervention, individual, initial 30 minutes     Diagnosis:   Encounter Diagnoses   Name Primary?    Severe obesity (H) Yes    Type 2 diabetes mellitus without complication, without long-term current use of insulin (H)        Subjective: Abel \"Lenin\" REKHA Flores is a 18 year old male who was referred as a part of the metabolic and bariatric surgery program. He presents with severe obesity (BMI >35 kg/m2) complicated by diabetes mellitus (Type 2), hepatic steatosis, and elevated BP. Preferred pronouns are he/him.     Objective: Spent session with Lenin discussing updates and preparing for upcoming surgery. Discussed recommendations regarding logistical and emotional preparation for surgery. Suggested he write letter to self regarding his journey and decision of surgery. Validated his emotional experiences of \"putting surgery out of his mind\" now and that he may be overwhelmed with emotion later. He is wrapping up his second semester of college in about a month and have surgery about a week later; therefore, he is most focused on finals right now.    Assessment: Lenin was appropriately dressed and well-groomed for the appointment. Attitude was cooperative and responses to questions were meaningful and clear. Mood was positive though overwhelmed. Affect was congruent, and when discussing being overwhelmed, Lenin teared up. Thought process was clear and logical.     Plan: Follow up with team visits following surgery.     Patrizia Carolina, PhD, LP, BCBA-D    of Pediatrics   Board Certified Behavior Analyst-Doctoral   Department of Pediatrics   *no letter     The author of this note documented a reason for not sharing it with the patient.    "

## 2024-04-11 NOTE — PATIENT INSTRUCTIONS
GOALS  Continue with goal of 65 grams protein per day. After surgery, start with protein and then work toward vegetables/fruit/grains   Eat at least 3-4 times per day to ensure adequate protein intakes   Post-op fluid goal 64 oz. Make sure you are waiting at least 20 minutes after eating before drinking fluids. Avoid caffeine, straws, carbonation  Continue with full liquid diet after discharge until 14 days post-surgery then advance to pureed diet x 2 weeks.

## 2024-04-11 NOTE — LETTER
"2024      RE: Abel Flores  [address]  [place] [address]     Dear Colleague,    Thank you for the opportunity to participate in the care of your patient, Abel Flores, at the [facility] at [facility]. Please see a copy of my visit note below.    PATIENT:  Abel Flores  :  2005  KARTHIK:  2024  Medical Nutrition Therapy    GOALS  Continue with goal of 65 grams protein per day. After surgery, start with protein and then work toward vegetables/fruit/grains   Eat at least 3-4 times per day to ensure adequate protein intakes   Post-op fluid goal 64 oz. Make sure you are waiting at least 20 minutes after eating before drinking fluids. Avoid caffeine, straws, carbonation  Continue with full liquid diet after discharge until 14 days post-surgery then advance to pureed diet x 2 weeks.          Nutrition Reassessment  Abel is a 18 year old year old male who [facility] to [facility] with severe obesity and type 2 diabetes. Abel was referred by Dr. Ana Rosa Akins for nutrition education and counseling, accompanied by father.    RD Visit #: 8    Anthropometrics  Wt Readings from Last 4 Encounters:   24 134.1 kg (295 lb 11.2 oz) (>99%, Z= 3.02)*   24 142.3 kg (313 lb 11.4 oz) (>99%, Z= 3.21)*   23 (!) 153 kg (337 lb 4.9 oz) (>99%, Z= 3.42)*   23 (!) 158.9 kg (350 lb 5 oz) (>99%, Z= 3.52)*     * Growth percentiles are based on CDC (Boys, 2-20 Years) data.     Ht Readings from Last 2 Encounters:   24 1.876 m (6' 1.86\") (94%, Z= 1.52)*   24 1.865 m (6' 1.43\") (91%, Z= 1.37)*     * Growth percentiles are based on CDC (Boys, 2-20 Years) data.     Estimated body mass index is 40.91 kg/m  as calculated from the following:    Height as of 24: 1.865 m (6' 1.43\").    Weight as of 24: 142.3 kg (313 lb 11.4 oz).    Nutrition History  Off Mounjaro x " 2 weeks. Feels that he's still losing weight dur to busy schedule. Has gained a little bit in the last few weeks. Low weight at home 290 pounds.     Noticing hunger more but less control over him.     Still wanting to have surgery.     Walking a lot around campus. [place] Feels that it's easier going up and down stairs, up a hill, walking.     Wake up and has first class before breakfast. Drinks water before breakfast.     Breakfast which is chicken sandwich/fries/fruit or vegetable side if interested.     Next class.     [employer] for home work.     Another class.     Dinner - same as first meal.     Sometimes has work in the evening. [employer] in [place]. Doesn't eat often. 4 chicken tenders. Water on shift.     No snacks after dinner.     No protein shakes. After surgery they bought the [employer] Nutrition Plan. Muscle Milk protein powder. Going to purchase unflavored protein powder.     Ashwaganda - for stress relief. This is helping. Melotonin at night. MVI (2)  and Calcium (2).     Water 64-80 oz fluids. SF monster.     Previous Goals & Progress  Continue aiming for 64 ounces fluid per day. Avoid straws/carbonation/caffeine - goal met  Continue to include protein with each meal - daily goal 65 grams per day - ongoing goal   Continue with MVI with Iron and calcium supplements (2 each daily - take MVI at a separate time from calcium to increase absorption) - goal met    Medications/Vitamins/Minerals    Current Outpatient Medications:      CALCIUM-VITAMIN D PO, Take by mouth 2 times daily, Disp: , Rfl:      clindamycin (CLEOCIN T) 1 % external solution, , Disp: , Rfl:      fexofenadine (ALLEGRA) 180 MG tablet, Take 180 mg by mouth daily as needed PRN, Disp: , Rfl:      ibuprofen (ADVIL/MOTRIN) 200 MG tablet, Take 200-400 mg by mouth, Disp: , Rfl:      Melatonin 10 MG CAPS, Take 10 mg by mouth nightly as needed, Disp: , Rfl:      multivitamin w/minerals (MULTI-VITAMIN) tablet, Take 1 tablet  by mouth 2 times daily, Disp: , Rfl:      Sharps Container MISC, 1 Container daily, Disp: 1 each, Rfl: 3     tirzepatide (MOUNJARO) 15 MG/0.5ML pen, Inject 15 mg Subcutaneous every 7 days, Disp: 2 mL, Rfl: 1    Nutrition Diagnosis  Obesity related to excessive energy intake as evidenced by BMI/age >95th %ile    Interventions & Education  Provided written and verbal education on the following:    Healthy meals/cooking  Healthy snacks  Healthy beverages  Portion sizes  Increase fruit and vegetable intake  64 oz Fluid/day  Sip fluids/avoid straws/ separate from meals  Eliminate caffeinated/carbonated beverages  Eat Protein first  Post-Op Diet Plan    Monitoring/Evaluation  Will continue to monitor progress towards goals and provide education in Pediatric Weight Management.    Spent 30 minutes in consult with patient & father.         Please do not hesitate to contact me if you have any questions/concerns.     Sincerely,       Amena Madera RD

## 2024-04-11 NOTE — PROGRESS NOTES
This patient is having a SG by Dr. Higgins on 5/14/24.   The following handouts were reviewed with the patient : Before Your Surgery, Patient Checklist, Weight Loss Surgery Pre-operative Class, Preop Recommendations Quick Reference Guide, History and Physical, Blood Bank Preadmission Order, Medications to Avoid, Shower or Bathing Before Surgery, Powerful Choices and [place] [facility] Directive. Questions were addressed and understanding of content was verbalized. Contact information was provided.         Patient goal weight: 340 lbs   Weight today: 295 lbs at appointment today - GOAL MET   PAC: 5/6/24  RD: 4/11/24

## 2024-04-11 NOTE — LETTER
"4/11/2024      RE: Abel Flores  [address]  [place] [address]     Dear Colleague,    Thank you for the opportunity to participate in the care of your patient, Abel Flores, at the [facility] at [facility]. Please see a copy of my visit note below.    Pediatric Psychology Progress Note    Start time: 11:30am  Stop time: 12:00pm  Service:  [id number] - Health behavior intervention, individual, initial 30 minutes     Diagnosis:   Encounter Diagnoses   Name Primary?     Severe obesity (H) Yes     Type 2 diabetes mellitus without complication, without long-term current use of insulin (H)        Subjective: Abel \"Lenin\" REKHA Flores is a 18 year old male who was referred as a part of the metabolic and bariatric surgery program. He presents with severe obesity (BMI >35 kg/m2) complicated by diabetes mellitus (Type 2), hepatic steatosis, and elevated BP. Preferred pronouns are he/him.     Objective: Spent session with Lenin discussing updates and preparing for upcoming surgery. Discussed recommendations regarding logistical and emotional preparation for surgery. Suggested he write letter to self regarding his journey and decision of surgery. Validated his emotional experiences of \"putting surgery out of his mind\" now and that he may be overwhelmed with emotion later. He is wrapping up his second semester of college in about a month and have surgery about a week later; therefore, he is most focused on finals right now.    Assessment: Lenin was appropriately dressed and well-groomed for the appointment. Attitude was cooperative and responses to questions were meaningful and clear. Mood was positive though overwhelmed. Affect was congruent, and when discussing being overwhelmed, Lenin teared up. Thought process was clear and logical.     Plan: Follow up with team visits following surgery.     Patrizia Carolina, PhD, LP, BCBA-D "    of Pediatrics   Board Certified Behavior Analyst-Doctoral   Department of Pediatrics   *no letter     The author of this note documented a reason for not sharing it with the patient.      Please do not hesitate to contact me if you have any questions/concerns.     Sincerely,       Patrizia Carolina LP, PhD LP

## 2024-05-06 ENCOUNTER — PRE VISIT (OUTPATIENT)
Dept: SURGERY | Facility: CLINIC | Age: 19
End: 2024-05-06

## 2024-05-06 ENCOUNTER — ANESTHESIA EVENT (OUTPATIENT)
Dept: SURGERY | Facility: CLINIC | Age: 19
End: 2024-05-06
Payer: COMMERCIAL

## 2024-05-06 ENCOUNTER — OFFICE VISIT (OUTPATIENT)
Dept: SURGERY | Facility: CLINIC | Age: 19
End: 2024-05-06
Payer: COMMERCIAL

## 2024-05-06 ENCOUNTER — LAB (OUTPATIENT)
Dept: LAB | Facility: CLINIC | Age: 19
End: 2024-05-06
Payer: COMMERCIAL

## 2024-05-06 ENCOUNTER — TELEPHONE (OUTPATIENT)
Dept: ENDOCRINOLOGY | Facility: CLINIC | Age: 19
End: 2024-05-06

## 2024-05-06 VITALS
HEART RATE: 78 BPM | HEIGHT: 74 IN | SYSTOLIC BLOOD PRESSURE: 128 MMHG | TEMPERATURE: 97.8 F | WEIGHT: 299.3 LBS | DIASTOLIC BLOOD PRESSURE: 76 MMHG | RESPIRATION RATE: 16 BRPM | BODY MASS INDEX: 38.41 KG/M2 | OXYGEN SATURATION: 98 %

## 2024-05-06 DIAGNOSIS — Z01.818 PREOP EXAMINATION: ICD-10-CM

## 2024-05-06 DIAGNOSIS — E66.01 MORBID OBESITY (H): ICD-10-CM

## 2024-05-06 DIAGNOSIS — Z01.818 PREOP EXAMINATION: Primary | ICD-10-CM

## 2024-05-06 LAB
ALBUMIN SERPL BCG-MCNC: 4.5 G/DL (ref 3.5–5.2)
ALP SERPL-CCNC: 77 U/L (ref 65–260)
ALT SERPL W P-5'-P-CCNC: 35 U/L (ref 0–50)
ANION GAP SERPL CALCULATED.3IONS-SCNC: 11 MMOL/L (ref 7–15)
AST SERPL W P-5'-P-CCNC: 30 U/L (ref 0–35)
BILIRUB SERPL-MCNC: 0.7 MG/DL
BUN SERPL-MCNC: 23.4 MG/DL (ref 6–20)
CALCIUM SERPL-MCNC: 9.8 MG/DL (ref 8.6–10)
CHLORIDE SERPL-SCNC: 107 MMOL/L (ref 98–107)
CREAT SERPL-MCNC: 0.89 MG/DL (ref 0.67–1.17)
DEPRECATED HCO3 PLAS-SCNC: 25 MMOL/L (ref 22–29)
EGFRCR SERPLBLD CKD-EPI 2021: >90 ML/MIN/1.73M2
ERYTHROCYTE [DISTWIDTH] IN BLOOD BY AUTOMATED COUNT: 13.2 % (ref 10–15)
GLUCOSE SERPL-MCNC: 90 MG/DL (ref 70–99)
HCT VFR BLD AUTO: 45 % (ref 40–53)
HGB BLD-MCNC: 14.7 G/DL (ref 13.3–17.7)
MCH RBC QN AUTO: 28.8 PG (ref 26.5–33)
MCHC RBC AUTO-ENTMCNC: 32.7 G/DL (ref 31.5–36.5)
MCV RBC AUTO: 88 FL (ref 78–100)
PLATELET # BLD AUTO: 324 10E3/UL (ref 150–450)
POTASSIUM SERPL-SCNC: 4.4 MMOL/L (ref 3.4–5.3)
PROT SERPL-MCNC: 7.7 G/DL (ref 6.4–8.3)
RBC # BLD AUTO: 5.1 10E6/UL (ref 4.4–5.9)
SODIUM SERPL-SCNC: 143 MMOL/L (ref 135–145)
WBC # BLD AUTO: 13.1 10E3/UL (ref 4–11)

## 2024-05-06 PROCEDURE — 85027 COMPLETE CBC AUTOMATED: CPT | Performed by: PATHOLOGY

## 2024-05-06 PROCEDURE — 99203 OFFICE O/P NEW LOW 30 MIN: CPT | Performed by: PHYSICIAN ASSISTANT

## 2024-05-06 PROCEDURE — 36415 COLL VENOUS BLD VENIPUNCTURE: CPT | Performed by: PATHOLOGY

## 2024-05-06 PROCEDURE — 80053 COMPREHEN METABOLIC PANEL: CPT | Performed by: PATHOLOGY

## 2024-05-06 RX ORDER — LIDOCAINE 40 MG/G
CREAM TOPICAL
Status: CANCELLED | OUTPATIENT
Start: 2024-05-06

## 2024-05-06 RX ORDER — SODIUM CHLORIDE, SODIUM LACTATE, POTASSIUM CHLORIDE, CALCIUM CHLORIDE 600; 310; 30; 20 MG/100ML; MG/100ML; MG/100ML; MG/100ML
INJECTION, SOLUTION INTRAVENOUS CONTINUOUS
Status: CANCELLED | OUTPATIENT
Start: 2024-05-06

## 2024-05-06 RX ORDER — APREPITANT 40 MG/1
40 CAPSULE ORAL ONCE
Status: CANCELLED | OUTPATIENT
Start: 2024-05-06 | End: 2024-05-06

## 2024-05-06 ASSESSMENT — LIFESTYLE VARIABLES: TOBACCO_USE: 0

## 2024-05-06 ASSESSMENT — PAIN SCALES - GENERAL: PAINLEVEL: NO PAIN (0)

## 2024-05-06 NOTE — TELEPHONE ENCOUNTER
Received the prior authorization from [facility] for a sleeve gastrectomy scheduled 5/14/24 with Dr. Higgins. Authorization ref # [id number] effective 05/01/24 - 10/27/24.

## 2024-05-06 NOTE — PATIENT INSTRUCTIONS
Preparing for Your Surgery      Name:  Abel Flores   MRN:  [id number]   :  2005   Today's Date:  2024       Arriving for surgery:  Surgery date:  24  Arrival time:  6:00 am    Please come to:     Please come to:       [facility]    [address] [place]  [address]     The [facility] [place] Patient/Visitor Ramp is at [address]. Patients and visitors who self-park will receive the reduced hospital parking rate. If the Patient /Visitor Ramp is full, please follow the signs to the [employer] car park located at the main hospital entrance.       parking is available (24 hours/ 7 days a week)      Discounted parking pass options are available for patients and visitors. They can be purchased at the [employer] desk at the main hospital entrance.     -    Stop at the security desk and they will direct surgery patients to the [facility] [phone]        - If you need directions, a wheelchair or an escort please stop at the Information/security desk in the lobby.     What can I eat or drink?  -  Upon waking on 24 - the day before surgery - you will have only clear liquids for the entire day until 12:00 am (midnight). You may have sips of water from midnight until 4:00 am on 24. Do not drink anything after 4:00 am unless to take morning medications.    Examples of clear liquids:  Water  Clear broth  Juices (apple, white grape, white cranberry  and cider) without pulp  Noncarbonated, powder based beverages  (lemonade and Jorge-Aid)  Sodas (Sprite, 7-Up, ginger ale and seltzer)  Coffee or tea (without milk or cream)  Gatorade    -  No Alcohol or cannabis products for at least 24 hours before surgery.     Which medicines can I take?    Hold Aspirin for 7 days before surgery.   Hold Multivitamins for 7 days before surgery.  Hold Supplements for 7  days before surgery.  Hold Ibuprofen (Advil, Motrin) for 1 day(s) before surgery--unless otherwise directed by surgeon.  Hold Naproxen (Aleve) for 4 days before surgery.    -  DO NOT take these medications the day of surgery:  Calcium-Vitamin D  Topical medications    -  PLEASE TAKE these medications per your usual routine:  Fexofenadine if needed  Melatonin if needed    How do I prepare myself?  - Please take 2 showers (one the night prior to surgery and one the morning of surgery) using Scrubcare or Hibiclens soap.    Use this soap only from the neck to your toes.     Leave the soap on your skin for one minute--then rinse thoroughly.      You may use your own shampoo and conditioner. No other hair products.   - Please remove all jewelry and body piercings.  - No lotions, deodorants or fragrance.  - No makeup or fingernail polish.   - Bring your ID and insurance card.    -If you use a CPAP machine, please bring the CPAP machine, tubing, and mask to hospital.    -If you have a Deep Brain Stimulator, Spinal Cord Stimulator, or any Neuro Stimulator device---you must bring the remote control to the hospital.      ALL PATIENTS GOING HOME THE SAME DAY OF SURGERY ARE REQUIRED TO HAVE A RESPONSIBLE ADULT TO DRIVE AND BE IN ATTENDANCE WITH THEM FOR 24 HOURS FOLLOWING SURGERY.    Covid testing policy as of 12/06/2022  Your surgeon will notify and schedule you for a COVID test if one is needed before surgery--please direct any questions or COVID symptoms to your surgeon      Questions or Concerns:    - For any questions regarding the day of surgery or your hospital stay, please contact the Pre Admission Nursing Office at [phone].       - If you have health changes between today and your surgery, please call your surgeon.       - For questions after surgery, please call your surgeons office.           Current Visitor Guidelines    You may have 2 visitors in the pre op area.    Visiting hours: 8 a.m. to 8:30  p.m.    Patients confirmed or suspected to have symptoms of COVID 19 or flu:     No visitors allowed for adult patients.   Children (under age 18) can have 1 named visitor.     People who are sick or showing symptoms of COVID 19 or flu:    Are not allowed to visit patients--we can only make exceptions in special situations.       Please follow these guidelines for your visit:          Please maintain social distance          Masking is optional--however at times you may be asked to wear a mask for the safety of yourself and others     Clean your hands with alcohol hand . Do this when you arrive at and leave the building and patient room,    And again after you touch your mask or anything in the room.     Go directly to and from the room you are visiting.     Stay in the patient s room during your visit. Limit going to other places in the hospital as much as possible     Leave bags and jackets at home or in the car.     For everyone s health, please don t come and go during your visit. That includes for smoking   during your visit.

## 2024-05-06 NOTE — H&P
Pre-Operative H & P     CC:  Preoperative exam to assess for increased cardiopulmonary risk while undergoing surgery and anesthesia.    Date of Encounter: 5/6/2024  Primary Care Physician:  Carrol Jimenez     Reason for visit:   Encounter Diagnoses   Name Primary?    Preop examination Yes    Morbid obesity (H)        HPI  Abel Flores is a 18 year old male who presents for pre-operative H & P in preparation for  Procedure Information       Case: [id number] Date/Time: 05/14/24 0800    Procedures:       Laparoscopic Sleeve GASTRECTOMY (Abdomen)      possible Laparoscopic Hiatal HERNIORRHAPHY (Abdomen)    Anesthesia type: General    Diagnosis: Morbid obesity (H) [E66.01]    Pre-op diagnosis: Morbid obesity (H) [E66.01]    Location:  OR [address] [place] [address] OR    Providers: Deuce Higgins MD            Lenin Flores has a past medical history significant for morbid obesity complicated by type 2 diabetes, elevated blood pressure, and nonalcoholic fatty liver disease.  He has been followed by Dr. Akins at the [facility] pediatric [facility] management clinic.  He progressed through the adolescent program and is now scheduled as above.    History is obtained from the patient and chart review    Hx of abnormal bleeding or anti-platelet use: Denies      Past Medical History  Past Medical History:   Diagnosis Date    Morbid obesity (H)     NAFLD (nonalcoholic fatty liver disease)        Past Surgical History  History reviewed. No pertinent surgical history.    Prior to Admission Medications  Current Outpatient Medications   Medication Sig Dispense Refill    CALCIUM-VITAMIN D PO Take by mouth 2 times daily      clindamycin (CLEOCIN T) 1 % external solution Apply topically as needed      fexofenadine (ALLEGRA) 180 MG tablet Take 180 mg by mouth daily as needed PRN      ibuprofen (ADVIL/MOTRIN) 200 MG tablet Take 200-400 mg by mouth every 4 hours as needed      Melatonin 10 MG CAPS Take 10 mg by mouth nightly as  needed      multivitamin w/minerals (MULTI-VITAMIN) tablet Take 1 tablet by mouth 2 times daily      Sharps Container MISC 1 Container daily 1 each 3       Allergies  Allergies   Allergen Reactions    Cat Hair Extract Itching and Swelling       Social History  Social History     Socioeconomic History    Marital status: Single     Spouse name: Not on file    Number of children: Not on file    Years of education: Not on file    Highest education level: Not on file   Occupational History    Not on file   Tobacco Use    Smoking status: Never    Smokeless tobacco: Never   Substance and Sexual Activity    Alcohol use: Yes     Comment: Sometimes    Drug use: Not Currently     Types: Marijuana    Sexual activity: Not on file   Other Topics Concern    Not on file   Social History Narrative    Not on file     Social Determinants of Health     Financial Resource Strain: High Risk (1/1/2022)    Received from [facility]    Financial Resource Strain     Difficulty of Paying Living Expenses: Not on file     Difficulty of Paying Living Expenses: Not on file   Food Insecurity: Not on file   Transportation Needs: Not on file   Physical Activity: Not on file   Stress: Not on file   Social Connections: Unknown (1/1/2022)    Received from [facility]    Social Connections     Frequency of Communication with Friends and Family: Not on file   Interpersonal Safety: Not on file   Housing Stability: Not on file       Family History  Family History   Problem Relation Age of Onset    Anesthesia Reaction No family hx of     Venous thrombosis No family hx of        Review of Systems  The complete review of systems is negative other than noted in the HPI or here.     Anesthesia Evaluation   Pt has not had prior anesthetic         ROS/MED HX  ENT/Pulmonary:     (+)     NASRIN risk factors,  "snores loudly,     allergic rhinitis,                          (-) tobacco use, asthma and recent URI   Neurologic:  - neg neurologic ROS     Cardiovascular:       METS/Exercise Tolerance: >4 METS    Hematologic:  - neg hematologic  ROS  (-) history of blood clots and history of blood transfusion   Musculoskeletal:  - neg musculoskeletal ROS     GI/Hepatic:     (+)             liver disease,    (-) GERD   Renal/Genitourinary:  - neg Renal ROS     Endo:     (+)  type II DM, Last HgA1c: 5.3, date: 2/8/2024, Not using insulin,          Obesity,       Psychiatric/Substance Use:     (+) psychiatric history anxiety       Infectious Disease:  - neg infectious disease ROS     Malignancy:  - neg malignancy ROS     Other:  - neg other ROS          /76 (BP Location: Right arm, Patient Position: Sitting, Cuff Size: Adult Large)   Pulse 78   Temp 97.8  F (36.6  C) (Oral)   Resp 16   Ht 1.877 m (6' 1.9\")   Wt 135.8 kg (299 lb 4.8 oz)   SpO2 98%   BMI 38.53 kg/m      Physical Exam   Constitutional: Awake, alert, cooperative, no apparent distress, and appears stated age.  Eyes: Pupils equal, round and reactive to light, extra ocular muscles intact, sclera clear, conjunctiva normal.  HENT: Normocephalic, oral pharynx with moist mucus membranes, good dentition. No goiter appreciated.   Respiratory: Clear to auscultation bilaterally, no crackles or wheezing.  Cardiovascular: Regular rate and rhythm, normal S1 and S2, and no murmur noted.  No edema. Palpable pulses to radial and PT arteries.   GI: Not assessed  Lymph/Hematologic: No cervical lymphadenopathy and no supraclavicular lymphadenopathy.  Genitourinary:  deferred  Skin: Warm and dry.  No rashes at anticipated surgical site.   Musculoskeletal: Full ROM of neck. There is no redness, warmth, or swelling of the joints. Gross motor strength is normal.    Neurologic: Awake, alert, oriented to name, place and time. Cranial nerves II-XII are grossly intact. Gait is " normal.   Neuropsychiatric: Calm, cooperative. Normal affect.     Prior Labs/Diagnostic Studies   All labs and imaging personally reviewed     Component      Latest Ref Rng 5/6/2024  9:15 AM   WBC      4.0 - 11.0 10e3/uL 13.1 (H)    RBC Count      4.40 - 5.90 10e6/uL 5.10    Hemoglobin      13.3 - 17.7 g/dL 14.7    Hematocrit      40.0 - 53.0 % 45.0    MCV      78 - 100 fL 88    MCH      26.5 - 33.0 pg 28.8    MCHC      31.5 - 36.5 g/dL 32.7    RDW      10.0 - 15.0 % 13.2    Platelet Count      150 - 450 10e3/uL 324       Legend:  (H) High    Component      Latest Ref Rng 5/6/2024  9:15 AM   Sodium      135 - 145 mmol/L 143    Potassium      3.4 - 5.3 mmol/L 4.4    Carbon Dioxide (CO2)      22 - 29 mmol/L 25    Anion Gap      7 - 15 mmol/L 11    Urea Nitrogen      6.0 - 20.0 mg/dL 23.4 (H)    Creatinine      0.67 - 1.17 mg/dL 0.89    GFR Estimate      >60 mL/min/1.73m2 >90    Calcium      8.6 - 10.0 mg/dL 9.8    Chloride      98 - 107 mmol/L 107    Glucose      70 - 99 mg/dL 90    Alkaline Phosphatase      65 - 260 U/L 77    AST      0 - 35 U/L 30    ALT      0 - 50 U/L 35    Protein Total      6.4 - 8.3 g/dL 7.7    Albumin      3.5 - 5.2 g/dL 4.5    Bilirubin Total      <=1.2 mg/dL 0.7       Legend:  (H) High    EKG/ stress test - if available please see in ROS above           The patient's records and results personally reviewed by this provider.     Outside records reviewed from: Care Everywhere    LAB/DIAGNOSTIC STUDIES TODAY: CBC, CMP    Assessment    Abel Flores is a 18 year old male seen as a PAC referral for risk assessment and optimization for anesthesia.    Plan/Recommendations  Pt will be optimized for the proposed procedure.  See below for details on the assessment, risk, and preoperative recommendations    NEUROLOGY  - No history of TIA, CVA or seizure    -Post Op delirium risk factors:  No risk identified    ENT  - No current airway concerns.  Will need to be reassessed day of  "surgery.  Mallampati: II  TM: > 3    CARDIAC  - Denies cardiac history  - METS (Metabolic Equivalents)  Patient performs 4 or more METS exercise without symptoms             Total Score: 0      RCRI-Low risk: Class 2 0.9% complication rate             Total Score: 1    RCRI: High Risk Surgery        PULMONARY    NASRIN Medium Risk             Total Score: 3    NASRIN: Snores loudly    NASRIN: BMI over 35 kg/m2    NASRIN: Male      - Denies asthma or inhaler use  - Tobacco History    History   Smoking Status    Never   Smokeless Tobacco    Never       GI  - Denies GERD  PONV Medium Risk  Total Score: 2           1 AN PONV: Patient is not a current smoker    1 AN PONV: Intended Post Op Opioids      -Per PAC protocol order aprepitant for preop on day of surgery, final decision per anesthesia      /RENAL  - Baseline Creatinine  0.89    ENDOCRINE    - BMI: Estimated body mass index is 38.53 kg/m  as calculated from the following:    Height as of this encounter: 1.877 m (6' 1.9\").    Weight as of this encounter: 135.8 kg (299 lb 4.8 oz).  Obesity (BMI >30)  - Diabetes  Hemoglobin A1C (%)   Date Value   02/08/2024 5.3     Diabetes Mellitus, Type 2, non-insulin dependent.  Hold morning oral hypoglycemic medications. Recommend close monitoring of the patient's blood glucose levels throughout the perioperative period.  - no longer taking antidiabetics      HEME  VTE Low Risk 0.5%             Total Score: 2    VTE: Male      - No history of abnormal bleeding or antiplatelet use.    PSYCH  -Depression and anxiety    Different anesthesia methods/types have been discussed with the patient, but they are aware that the final plan will be decided by the assigned anesthesia provider on the date of service.      The patient is optimized for their procedure. AVS with information on surgery time/arrival time, meds and NPO status given by nursing staff. No further diagnostic testing indicated.      On the day of service:     Prep time: 10 " minutes  Visit time: 10 minutes  Documentation time: 10 minutes  ------------------------------------------  Total time: 30 minutes      Linn Galloway PA-C  Preoperative Assessment Center  [facility]  Clinic and Surgery Center  Phone: [phone]  Fax: [phone]

## 2024-05-09 ENCOUNTER — OFFICE VISIT (OUTPATIENT)
Dept: FAMILY MEDICINE | Facility: CLINIC | Age: 19
End: 2024-05-09
Payer: COMMERCIAL

## 2024-05-09 VITALS
BODY MASS INDEX: 38.21 KG/M2 | RESPIRATION RATE: 16 BRPM | TEMPERATURE: 98.5 F | SYSTOLIC BLOOD PRESSURE: 127 MMHG | HEART RATE: 98 BPM | DIASTOLIC BLOOD PRESSURE: 79 MMHG | WEIGHT: 296.8 LBS | OXYGEN SATURATION: 97 %

## 2024-05-09 DIAGNOSIS — J02.9 SORE THROAT: Primary | ICD-10-CM

## 2024-05-09 LAB
DEPRECATED S PYO AG THROAT QL EIA: NEGATIVE
GROUP A STREP BY PCR: NOT DETECTED

## 2024-05-09 PROCEDURE — 99213 OFFICE O/P EST LOW 20 MIN: CPT

## 2024-05-09 PROCEDURE — 87651 STREP A DNA AMP PROBE: CPT

## 2024-05-09 ASSESSMENT — ENCOUNTER SYMPTOMS: SORE THROAT: 1

## 2024-05-09 NOTE — PROGRESS NOTES
"  Assessment & Plan     Sore throat  Patient with sore throat for 1 day.  Rapid strep negative in clinic today.  He is scheduled to have surgery for gastric sleeve in approximately 5 days.  He wants to make sure that this is not strep.  No exudate on exam, erythema without tonsillitis.  Patient denies any fevers.  Patient has mild cough.  Discussed saline gargle and over-the-counter medications as needed for symptom relief.  Patient be notified of PCR positive and plan of care adjusted as needed.  - Streptococcus A Rapid Screen w/Reflex to PCR - Clinic Collect  - Group A Streptococcus PCR Throat Swab            BMI  Estimated body mass index is 38.21 kg/m  as calculated from the following:    Height as of 5/6/24: 1.877 m (6' 1.9\").    Weight as of this encounter: 134.6 kg (296 lb 12.8 oz).             Stevan Phelps is a 18 year old, presenting for the following health issues:  Pharyngitis (Sore throat x 1 day )        5/9/2024     3:11 PM   Additional Questions   Roomed by RAIZA Knutson     History of Present Illness       Reason for visit:  Illness and posibilty of sickness  Symptom onset:  1-3 days ago  Symptoms include:  Sore throught coughing reddness pain when swallowing  Symptom intensity:  Mild  Symptom progression:  Improving  Had these symptoms before:  Yes  Has tried/received treatment for these symptoms:  Yes  Previous treatment was successful:  Yes  Prior treatment description:  Anti biotics for strep  What makes it worse:  No  What makes it better:  Water    He eats 2-3 servings of fruits and vegetables daily.He consumes 1 sweetened beverage(s) daily.He exercises with enough effort to increase his heart rate 30 to 60 minutes per day.  He exercises with enough effort to increase his heart rate 5 days per week.   He is taking medications regularly.                     Objective    /79 (BP Location: Right arm, Patient Position: Sitting, Cuff Size: Adult Large)   Pulse 98   Temp 98.5  F (36.9  C) " (Oral)   Resp 16   Wt 134.6 kg (296 lb 12.8 oz)   SpO2 97%   BMI 38.21 kg/m    Body mass index is 38.21 kg/m .  Physical Exam  HENT:      Head: Normocephalic.      Mouth/Throat:      Mouth: Mucous membranes are moist.      Pharynx: Oropharynx is clear. Posterior oropharyngeal erythema present. No oropharyngeal exudate.   Eyes:      Extraocular Movements: Extraocular movements intact.      Conjunctiva/sclera: Conjunctivae normal.   Cardiovascular:      Rate and Rhythm: Normal rate.   Pulmonary:      Effort: Pulmonary effort is normal.   Lymphadenopathy:      Cervical: No cervical adenopathy.   Skin:     General: Skin is warm and dry.      Capillary Refill: Capillary refill takes less than 2 seconds.   Neurological:      Mental Status: He is alert and oriented to person, place, and time.   Psychiatric:         Behavior: Behavior normal.         Thought Content: Thought content normal.                    Signed Electronically by: JOELLE Almodovar CNP

## 2024-05-09 NOTE — LETTER
May 10, 2024      Lenin Flores  [address]  [place] [address]        Dear ,    We are writing to inform you of your test results.    Your test results fall within the expected range(s) or remain unchanged from previous results.  Please continue with current treatment plan.    Resulted Orders   Streptococcus A Rapid Screen w/Reflex to PCR - Clinic Collect   Result Value Ref Range    Group A Strep antigen Negative Negative   Group A Streptococcus PCR Throat Swab   Result Value Ref Range    Group A strep by PCR Not Detected Not Detected    Narrative    The Xpert Xpress Strep A test, performed on the [employer], is a rapid, qualitative in vitro diagnostic test for the detection of Streptococcus pyogenes (Group A ß-hemolytic Streptococcus, Strep A) in throat swab specimens from patients with signs and symptoms of pharyngitis. The Xpert Xpress Strep A test can be used as an aid in the diagnosis of Group A Streptococcal pharyngitis. The assay is not intended to monitor treatment for Group A Streptococcus infections. The Xpert Xpress Strep A test utilizes an automated real-time polymerase chain reaction (PCR) to detect Streptococcus pyogenes DNA.       If you have any questions or concerns, please call the clinic at the number listed above.       Sincerely,      JOELLE Almodovar CNP

## 2024-05-14 ENCOUNTER — HOSPITAL ENCOUNTER (INPATIENT)
Facility: CLINIC | Age: 19
LOS: 1 days | Discharge: HOME | End: 2024-05-15
Attending: SURGERY | Admitting: SURGERY
Payer: COMMERCIAL

## 2024-05-14 ENCOUNTER — ANESTHESIA (OUTPATIENT)
Dept: SURGERY | Facility: CLINIC | Age: 19
End: 2024-05-14
Payer: COMMERCIAL

## 2024-05-14 DIAGNOSIS — E66.01 MORBID OBESITY (H): ICD-10-CM

## 2024-05-14 DIAGNOSIS — Z98.84 S/P LAPAROSCOPIC SLEEVE GASTRECTOMY: Primary | ICD-10-CM

## 2024-05-14 LAB
CREAT SERPL-MCNC: 0.67 MG/DL (ref 0.67–1.17)
EGFRCR SERPLBLD CKD-EPI 2021: >90 ML/MIN/1.73M2
GLUCOSE BLDC GLUCOMTR-MCNC: 133 MG/DL (ref 70–99)
GLUCOSE BLDC GLUCOMTR-MCNC: 91 MG/DL (ref 70–99)

## 2024-05-14 PROCEDURE — 710N000010 HC RECOVERY PHASE 1, LEVEL 2, PER MIN: Performed by: SURGERY

## 2024-05-14 PROCEDURE — 250N000011 HC RX IP 250 OP 636: Performed by: SURGERY

## 2024-05-14 PROCEDURE — 0DB64Z3 EXCISION OF STOMACH, PERCUTANEOUS ENDOSCOPIC APPROACH, VERTICAL: ICD-10-PCS | Performed by: SURGERY

## 2024-05-14 PROCEDURE — 272N000001 HC OR GENERAL SUPPLY STERILE: Performed by: SURGERY

## 2024-05-14 PROCEDURE — 82565 ASSAY OF CREATININE: CPT | Performed by: NURSE PRACTITIONER

## 2024-05-14 PROCEDURE — 36415 COLL VENOUS BLD VENIPUNCTURE: CPT | Performed by: NURSE PRACTITIONER

## 2024-05-14 PROCEDURE — 360N000077 HC SURGERY LEVEL 4, PER MIN: Performed by: SURGERY

## 2024-05-14 PROCEDURE — 43775 LAP SLEEVE GASTRECTOMY: CPT | Performed by: NURSE ANESTHETIST, CERTIFIED REGISTERED

## 2024-05-14 PROCEDURE — 258N000003 HC RX IP 258 OP 636

## 2024-05-14 PROCEDURE — 250N000009 HC RX 250: Performed by: NURSE PRACTITIONER

## 2024-05-14 PROCEDURE — 250N000013 HC RX MED GY IP 250 OP 250 PS 637: Performed by: NURSE PRACTITIONER

## 2024-05-14 PROCEDURE — 88305 TISSUE EXAM BY PATHOLOGIST: CPT | Mod: TC | Performed by: SURGERY

## 2024-05-14 PROCEDURE — 43775 LAP SLEEVE GASTRECTOMY: CPT | Performed by: SURGERY

## 2024-05-14 PROCEDURE — 250N000011 HC RX IP 250 OP 636

## 2024-05-14 PROCEDURE — 250N000013 HC RX MED GY IP 250 OP 250 PS 637: Performed by: SURGERY

## 2024-05-14 PROCEDURE — 999N000141 HC STATISTIC PRE-PROCEDURE NURSING ASSESSMENT: Performed by: SURGERY

## 2024-05-14 PROCEDURE — 370N000017 HC ANESTHESIA TECHNICAL FEE, PER MIN: Performed by: SURGERY

## 2024-05-14 PROCEDURE — 88305 TISSUE EXAM BY PATHOLOGIST: CPT | Mod: 26 | Performed by: PATHOLOGY

## 2024-05-14 PROCEDURE — 258N000003 HC RX IP 258 OP 636: Performed by: NURSE PRACTITIONER

## 2024-05-14 PROCEDURE — 258N000003 HC RX IP 258 OP 636: Performed by: NURSE ANESTHETIST, CERTIFIED REGISTERED

## 2024-05-14 PROCEDURE — 250N000012 HC RX MED GY IP 250 OP 636 PS 637: Performed by: PHYSICIAN ASSISTANT

## 2024-05-14 PROCEDURE — 250N000011 HC RX IP 250 OP 636: Performed by: NURSE ANESTHETIST, CERTIFIED REGISTERED

## 2024-05-14 PROCEDURE — 250N000009 HC RX 250: Performed by: NURSE ANESTHETIST, CERTIFIED REGISTERED

## 2024-05-14 PROCEDURE — 120N000002 HC R&B MED SURG/OB UMMC

## 2024-05-14 PROCEDURE — 250N000025 HC SEVOFLURANE, PER MIN: Performed by: SURGERY

## 2024-05-14 RX ORDER — FENTANYL CITRATE 50 UG/ML
50 INJECTION, SOLUTION INTRAMUSCULAR; INTRAVENOUS EVERY 5 MIN PRN
Status: DISCONTINUED | OUTPATIENT
Start: 2024-05-14 | End: 2024-05-14 | Stop reason: HOSPADM

## 2024-05-14 RX ORDER — ENALAPRILAT 1.25 MG/ML
1.25 INJECTION INTRAVENOUS EVERY 6 HOURS PRN
Status: DISCONTINUED | OUTPATIENT
Start: 2024-05-14 | End: 2024-05-15 | Stop reason: HOSPADM

## 2024-05-14 RX ORDER — DIPHENHYDRAMINE HCL 25 MG
25 CAPSULE ORAL EVERY 6 HOURS PRN
Status: DISCONTINUED | OUTPATIENT
Start: 2024-05-14 | End: 2024-05-15 | Stop reason: HOSPADM

## 2024-05-14 RX ORDER — NALOXONE HYDROCHLORIDE 0.4 MG/ML
0.4 INJECTION, SOLUTION INTRAMUSCULAR; INTRAVENOUS; SUBCUTANEOUS
Status: DISCONTINUED | OUTPATIENT
Start: 2024-05-14 | End: 2024-05-15 | Stop reason: HOSPADM

## 2024-05-14 RX ORDER — NALOXONE HYDROCHLORIDE 0.4 MG/ML
0.2 INJECTION, SOLUTION INTRAMUSCULAR; INTRAVENOUS; SUBCUTANEOUS
Status: DISCONTINUED | OUTPATIENT
Start: 2024-05-14 | End: 2024-05-15 | Stop reason: HOSPADM

## 2024-05-14 RX ORDER — AMOXICILLIN 250 MG
2 CAPSULE ORAL 2 TIMES DAILY
Status: DISCONTINUED | OUTPATIENT
Start: 2024-05-14 | End: 2024-05-15 | Stop reason: HOSPADM

## 2024-05-14 RX ORDER — SODIUM CHLORIDE, SODIUM LACTATE, POTASSIUM CHLORIDE, CALCIUM CHLORIDE 600; 310; 30; 20 MG/100ML; MG/100ML; MG/100ML; MG/100ML
INJECTION, SOLUTION INTRAVENOUS CONTINUOUS PRN
Status: DISCONTINUED | OUTPATIENT
Start: 2024-05-14 | End: 2024-05-14

## 2024-05-14 RX ORDER — SODIUM CHLORIDE, SODIUM LACTATE, POTASSIUM CHLORIDE, CALCIUM CHLORIDE 600; 310; 30; 20 MG/100ML; MG/100ML; MG/100ML; MG/100ML
INJECTION, SOLUTION INTRAVENOUS CONTINUOUS
Status: DISCONTINUED | OUTPATIENT
Start: 2024-05-14 | End: 2024-05-15 | Stop reason: HOSPADM

## 2024-05-14 RX ORDER — ENOXAPARIN SODIUM 100 MG/ML
40 INJECTION SUBCUTANEOUS EVERY 24 HOURS
Status: DISCONTINUED | OUTPATIENT
Start: 2024-05-15 | End: 2024-05-15 | Stop reason: HOSPADM

## 2024-05-14 RX ORDER — DEXAMETHASONE SODIUM PHOSPHATE 4 MG/ML
4 INJECTION, SOLUTION INTRA-ARTICULAR; INTRALESIONAL; INTRAMUSCULAR; INTRAVENOUS; SOFT TISSUE
Status: DISCONTINUED | OUTPATIENT
Start: 2024-05-14 | End: 2024-05-14 | Stop reason: HOSPADM

## 2024-05-14 RX ORDER — ONDANSETRON 2 MG/ML
4 INJECTION INTRAMUSCULAR; INTRAVENOUS EVERY 30 MIN PRN
Status: DISCONTINUED | OUTPATIENT
Start: 2024-05-14 | End: 2024-05-14 | Stop reason: HOSPADM

## 2024-05-14 RX ORDER — CEFAZOLIN SODIUM/WATER 3 G/30 ML
3 SYRINGE (ML) INTRAVENOUS
Status: COMPLETED | OUTPATIENT
Start: 2024-05-14 | End: 2024-05-14

## 2024-05-14 RX ORDER — OXYCODONE HYDROCHLORIDE 10 MG/1
10 TABLET ORAL
Status: DISCONTINUED | OUTPATIENT
Start: 2024-05-14 | End: 2024-05-15 | Stop reason: HOSPADM

## 2024-05-14 RX ORDER — APREPITANT 40 MG/1
40 CAPSULE ORAL ONCE
Status: COMPLETED | OUTPATIENT
Start: 2024-05-14 | End: 2024-05-14

## 2024-05-14 RX ORDER — FENTANYL CITRATE 50 UG/ML
25 INJECTION, SOLUTION INTRAMUSCULAR; INTRAVENOUS EVERY 5 MIN PRN
Status: DISCONTINUED | OUTPATIENT
Start: 2024-05-14 | End: 2024-05-14 | Stop reason: HOSPADM

## 2024-05-14 RX ORDER — LIDOCAINE 40 MG/G
CREAM TOPICAL
Status: DISCONTINUED | OUTPATIENT
Start: 2024-05-14 | End: 2024-05-15 | Stop reason: HOSPADM

## 2024-05-14 RX ORDER — ONDANSETRON 2 MG/ML
4 INJECTION INTRAMUSCULAR; INTRAVENOUS
Status: DISCONTINUED | OUTPATIENT
Start: 2024-05-14 | End: 2024-05-14 | Stop reason: HOSPADM

## 2024-05-14 RX ORDER — NALOXONE HYDROCHLORIDE 0.4 MG/ML
0.1 INJECTION, SOLUTION INTRAMUSCULAR; INTRAVENOUS; SUBCUTANEOUS
Status: DISCONTINUED | OUTPATIENT
Start: 2024-05-14 | End: 2024-05-14 | Stop reason: HOSPADM

## 2024-05-14 RX ORDER — PROCHLORPERAZINE MALEATE 5 MG
10 TABLET ORAL EVERY 6 HOURS PRN
Status: DISCONTINUED | OUTPATIENT
Start: 2024-05-14 | End: 2024-05-15 | Stop reason: HOSPADM

## 2024-05-14 RX ORDER — BUPIVACAINE HYDROCHLORIDE 2.5 MG/ML
INJECTION, SOLUTION EPIDURAL; INFILTRATION; INTRACAUDAL PRN
Status: DISCONTINUED | OUTPATIENT
Start: 2024-05-14 | End: 2024-05-14 | Stop reason: HOSPADM

## 2024-05-14 RX ORDER — DIPHENHYDRAMINE HYDROCHLORIDE 50 MG/ML
25 INJECTION INTRAMUSCULAR; INTRAVENOUS EVERY 6 HOURS PRN
Status: DISCONTINUED | OUTPATIENT
Start: 2024-05-14 | End: 2024-05-15 | Stop reason: HOSPADM

## 2024-05-14 RX ORDER — ONDANSETRON 2 MG/ML
4 INJECTION INTRAMUSCULAR; INTRAVENOUS EVERY 6 HOURS PRN
Status: DISCONTINUED | OUTPATIENT
Start: 2024-05-14 | End: 2024-05-15 | Stop reason: HOSPADM

## 2024-05-14 RX ORDER — HYDROMORPHONE HCL IN WATER/PF 6 MG/30 ML
0.2 PATIENT CONTROLLED ANALGESIA SYRINGE INTRAVENOUS
Status: DISCONTINUED | OUTPATIENT
Start: 2024-05-14 | End: 2024-05-15 | Stop reason: HOSPADM

## 2024-05-14 RX ORDER — SCOLOPAMINE TRANSDERMAL SYSTEM 1 MG/1
1 PATCH, EXTENDED RELEASE TRANSDERMAL
Status: DISCONTINUED | OUTPATIENT
Start: 2024-05-14 | End: 2024-05-15 | Stop reason: HOSPADM

## 2024-05-14 RX ORDER — ONDANSETRON 4 MG/1
4 TABLET, ORALLY DISINTEGRATING ORAL EVERY 30 MIN PRN
Status: DISCONTINUED | OUTPATIENT
Start: 2024-05-14 | End: 2024-05-14 | Stop reason: HOSPADM

## 2024-05-14 RX ORDER — SODIUM CHLORIDE, SODIUM LACTATE, POTASSIUM CHLORIDE, CALCIUM CHLORIDE 600; 310; 30; 20 MG/100ML; MG/100ML; MG/100ML; MG/100ML
INJECTION, SOLUTION INTRAVENOUS CONTINUOUS
Status: DISCONTINUED | OUTPATIENT
Start: 2024-05-14 | End: 2024-05-14 | Stop reason: HOSPADM

## 2024-05-14 RX ORDER — KETOROLAC TROMETHAMINE 30 MG/ML
15 INJECTION, SOLUTION INTRAMUSCULAR; INTRAVENOUS EVERY 6 HOURS PRN
Status: DISCONTINUED | OUTPATIENT
Start: 2024-05-14 | End: 2024-05-15 | Stop reason: HOSPADM

## 2024-05-14 RX ORDER — HYDROMORPHONE HCL IN WATER/PF 6 MG/30 ML
0.4 PATIENT CONTROLLED ANALGESIA SYRINGE INTRAVENOUS EVERY 5 MIN PRN
Status: DISCONTINUED | OUTPATIENT
Start: 2024-05-14 | End: 2024-05-14 | Stop reason: HOSPADM

## 2024-05-14 RX ORDER — ONDANSETRON 2 MG/ML
INJECTION INTRAMUSCULAR; INTRAVENOUS PRN
Status: DISCONTINUED | OUTPATIENT
Start: 2024-05-14 | End: 2024-05-14

## 2024-05-14 RX ORDER — HYDROMORPHONE HCL IN WATER/PF 6 MG/30 ML
0.2 PATIENT CONTROLLED ANALGESIA SYRINGE INTRAVENOUS EVERY 5 MIN PRN
Status: DISCONTINUED | OUTPATIENT
Start: 2024-05-14 | End: 2024-05-14 | Stop reason: HOSPADM

## 2024-05-14 RX ORDER — ACETAMINOPHEN 325 MG/1
975 TABLET ORAL ONCE
Status: COMPLETED | OUTPATIENT
Start: 2024-05-14 | End: 2024-05-14

## 2024-05-14 RX ORDER — ENOXAPARIN SODIUM 100 MG/ML
40 INJECTION SUBCUTANEOUS
Status: COMPLETED | OUTPATIENT
Start: 2024-05-14 | End: 2024-05-14

## 2024-05-14 RX ORDER — FENTANYL CITRATE 50 UG/ML
INJECTION, SOLUTION INTRAMUSCULAR; INTRAVENOUS PRN
Status: DISCONTINUED | OUTPATIENT
Start: 2024-05-14 | End: 2024-05-14

## 2024-05-14 RX ORDER — DEXAMETHASONE SODIUM PHOSPHATE 4 MG/ML
INJECTION, SOLUTION INTRA-ARTICULAR; INTRALESIONAL; INTRAMUSCULAR; INTRAVENOUS; SOFT TISSUE PRN
Status: DISCONTINUED | OUTPATIENT
Start: 2024-05-14 | End: 2024-05-14

## 2024-05-14 RX ORDER — LIDOCAINE 40 MG/G
CREAM TOPICAL
Status: DISCONTINUED | OUTPATIENT
Start: 2024-05-14 | End: 2024-05-14 | Stop reason: HOSPADM

## 2024-05-14 RX ORDER — CEFAZOLIN SODIUM/WATER 3 G/30 ML
3 SYRINGE (ML) INTRAVENOUS SEE ADMIN INSTRUCTIONS
Status: DISCONTINUED | OUTPATIENT
Start: 2024-05-14 | End: 2024-05-14 | Stop reason: HOSPADM

## 2024-05-14 RX ORDER — ACETAMINOPHEN 325 MG/1
650 TABLET ORAL EVERY 4 HOURS PRN
Status: DISCONTINUED | OUTPATIENT
Start: 2024-05-14 | End: 2024-05-15 | Stop reason: HOSPADM

## 2024-05-14 RX ORDER — PROPOFOL 10 MG/ML
INJECTION, EMULSION INTRAVENOUS PRN
Status: DISCONTINUED | OUTPATIENT
Start: 2024-05-14 | End: 2024-05-14

## 2024-05-14 RX ORDER — OXYCODONE HYDROCHLORIDE 5 MG/1
5 TABLET ORAL
Status: DISCONTINUED | OUTPATIENT
Start: 2024-05-14 | End: 2024-05-15 | Stop reason: HOSPADM

## 2024-05-14 RX ORDER — ONDANSETRON 4 MG/1
4 TABLET, ORALLY DISINTEGRATING ORAL EVERY 6 HOURS PRN
Status: DISCONTINUED | OUTPATIENT
Start: 2024-05-14 | End: 2024-05-15 | Stop reason: HOSPADM

## 2024-05-14 RX ORDER — PROPOFOL 10 MG/ML
INJECTION, EMULSION INTRAVENOUS
Status: DISCONTINUED | OUTPATIENT
Start: 2024-05-14 | End: 2024-05-14

## 2024-05-14 RX ADMIN — PROPOFOL 300 MG: 10 INJECTION, EMULSION INTRAVENOUS at 08:19

## 2024-05-14 RX ADMIN — APREPITANT 40 MG: 40 CAPSULE ORAL at 07:07

## 2024-05-14 RX ADMIN — PHENYLEPHRINE HYDROCHLORIDE 150 MCG: 10 INJECTION INTRAVENOUS at 08:40

## 2024-05-14 RX ADMIN — Medication 80 MG: at 08:19

## 2024-05-14 RX ADMIN — SODIUM CHLORIDE, POTASSIUM CHLORIDE, SODIUM LACTATE AND CALCIUM CHLORIDE: 600; 310; 30; 20 INJECTION, SOLUTION INTRAVENOUS at 08:13

## 2024-05-14 RX ADMIN — SCOPALAMINE 1 PATCH: 1 PATCH, EXTENDED RELEASE TRANSDERMAL at 12:40

## 2024-05-14 RX ADMIN — MIDAZOLAM 2 MG: 1 INJECTION INTRAMUSCULAR; INTRAVENOUS at 08:08

## 2024-05-14 RX ADMIN — FENTANYL CITRATE 200 MCG: 50 INJECTION INTRAMUSCULAR; INTRAVENOUS at 08:19

## 2024-05-14 RX ADMIN — FAMOTIDINE 20 MG: 10 INJECTION, SOLUTION INTRAVENOUS at 07:07

## 2024-05-14 RX ADMIN — SODIUM CHLORIDE, POTASSIUM CHLORIDE, SODIUM LACTATE AND CALCIUM CHLORIDE: 600; 310; 30; 20 INJECTION, SOLUTION INTRAVENOUS at 09:43

## 2024-05-14 RX ADMIN — ONDANSETRON 4 MG: 2 INJECTION INTRAMUSCULAR; INTRAVENOUS at 08:42

## 2024-05-14 RX ADMIN — ACETAMINOPHEN 975 MG: 325 TABLET, FILM COATED ORAL at 07:07

## 2024-05-14 RX ADMIN — OXYCODONE HYDROCHLORIDE 5 MG: 5 TABLET ORAL at 15:15

## 2024-05-14 RX ADMIN — OXYCODONE HYDROCHLORIDE 5 MG: 5 TABLET ORAL at 20:18

## 2024-05-14 RX ADMIN — SODIUM CHLORIDE, POTASSIUM CHLORIDE, SODIUM LACTATE AND CALCIUM CHLORIDE: 600; 310; 30; 20 INJECTION, SOLUTION INTRAVENOUS at 12:40

## 2024-05-14 RX ADMIN — Medication 3 G: at 08:22

## 2024-05-14 RX ADMIN — DOCUSATE SODIUM 50 MG AND SENNOSIDES 8.6 MG 2 TABLET: 8.6; 5 TABLET, FILM COATED ORAL at 20:18

## 2024-05-14 RX ADMIN — ONDANSETRON 4 MG: 2 INJECTION INTRAMUSCULAR; INTRAVENOUS at 10:55

## 2024-05-14 RX ADMIN — SODIUM CHLORIDE, POTASSIUM CHLORIDE, SODIUM LACTATE AND CALCIUM CHLORIDE: 600; 310; 30; 20 INJECTION, SOLUTION INTRAVENOUS at 22:12

## 2024-05-14 RX ADMIN — FENTANYL CITRATE 50 MCG: 50 INJECTION INTRAMUSCULAR; INTRAVENOUS at 09:24

## 2024-05-14 RX ADMIN — ENOXAPARIN SODIUM 40 MG: 40 INJECTION SUBCUTANEOUS at 07:07

## 2024-05-14 RX ADMIN — DEXAMETHASONE SODIUM PHOSPHATE 6 MG: 4 INJECTION, SOLUTION INTRA-ARTICULAR; INTRALESIONAL; INTRAMUSCULAR; INTRAVENOUS; SOFT TISSUE at 08:32

## 2024-05-14 RX ADMIN — SUGAMMADEX 400 MG: 100 INJECTION, SOLUTION INTRAVENOUS at 09:06

## 2024-05-14 RX ADMIN — FENTANYL CITRATE 25 MCG: 50 INJECTION, SOLUTION INTRAMUSCULAR; INTRAVENOUS at 10:05

## 2024-05-14 ASSESSMENT — ACTIVITIES OF DAILY LIVING (ADL)
ADLS_ACUITY_SCORE: 20
ADLS_ACUITY_SCORE: 20
ADLS_ACUITY_SCORE: 18
ADLS_ACUITY_SCORE: 20
ADLS_ACUITY_SCORE: 16
ADLS_ACUITY_SCORE: 18
ADLS_ACUITY_SCORE: 20
ADLS_ACUITY_SCORE: 18
ADLS_ACUITY_SCORE: 20
ADLS_ACUITY_SCORE: 20
ADLS_ACUITY_SCORE: 18
ADLS_ACUITY_SCORE: 20
ADLS_ACUITY_SCORE: 18

## 2024-05-14 NOTE — ANESTHESIA PROCEDURE NOTES
Airway       Patient location during procedure: OR       Procedure Start/Stop Times: 5/14/2024 8:21 AM  Staff -        Anesthesiologist:  Fredi Irene MD       CRNA: Amena Connell APRN CRNA       Performed By: CRNA  Consent for Airway        Urgency: elective  Indications and Patient Condition       Indications for airway management: aries-procedural       Induction type:intravenous       Mask difficulty assessment: 2 - vent by mask + OA or adjuvant +/- NMBA    Final Airway Details       Final airway type: endotracheal airway       Successful airway: ETT - single  Endotracheal Airway Details        ETT size (mm): 8.0       Cuffed: yes       Cuff volume (mL): 7       Successful intubation technique: video laryngoscopy       VL Blade Size: Glidescope 4       Grade View of Cords: 1       Adjucts: stylet       Position: Right       Measured from: gums/teeth       Secured at (cm): 23       Bite block used: None    Post intubation assessment        Placement verified by: capnometry, equal breath sounds and chest rise        Number of attempts at approach: 1       Secured with: tape       Ease of procedure: easy       Dentition: Intact    Medication(s) Administered   Medication Administration Time: 5/14/2024 8:21 AM

## 2024-05-14 NOTE — OP NOTE
[facility]    Operative Note    Pre-operative diagnosis: Morbid obesity (H) [E66.01]   Post-operative diagnosis * No post-op diagnosis entered *   Procedure: Procedure(s):  Laparoscopic Sleeve GASTRECTOMY   Surgeon: Surgeons and Role:     * Deuce Higgins MD - Primary   Assistant Surgeon                        Anesthesia: The assistance of Nichole Aden NP was required in this case due to advanced laparoscopy technique; she assisted by performing port assistance and providing exposure to patient bedside while I was dissecting.    I attest that no qualified resident or fellow was available to assist for this surgery due adequate training and skills to assist with this technically challenging case and instrumentation; as a consequence, I attest that Nichole performed these needed skills.    General    Estimated blood loss: Less than 10 ml   Drains: None   Specimens: ID Type Source Tests Collected by Time Destination   1 : partial gastrectomy Tissue Stomach, Greater Curvature SURGICAL PATHOLOGY EXAM Deuce Higgins MD 5/14/2024  8:42 AM    ; partial gastrectomy   Findings: None; normal liver; no adhesions; no hiatal hernia.   Complications: None   Implants: None         BOUGIE SIZE: 40 FR  DISTANCE FROM PYLORUS: 10 CM  STAPLE LINE REINFORCEMENT: NO  STAPLE LINE OVERSEW: NO  COMORBIDITIES:   Past Medical History:   Diagnosis Date    Morbid obesity (H)     NAFLD (nonalcoholic fatty liver disease)        INDICATIONS FOR PROCEDURE  Abel Flores is a 18 year old male who is morbidly obese.  Numerous weight loss attempts without surgery have been without success.     After understanding the risks and benefits of proceeding with a laparoscopic vertical sleeve gastrectomy, he agreed to an operation as outlined by me.    I reviewed the risks of surgery with Abel Flores.    These include, but are not limited to, death, myocardial infarction, pneumonia, urinary  "tract infection, deep venous thrombosis with or without pulmonary embolus, abdominal infection from bowel injury or abscess, bowel obstruction, wound infection, and bleeding; furthermore, there is risk that the intended approach of the surgery (for example, laparoscopic) would need to be changed to open (laparotomy) if laparoscopy does not continue to be safe  Finally, there is also potential that the intended procedure will NOT provide benefit.    More specific risks related to vertical sleeve gastrectomy were detailed at the bariatric informational seminar and include the followin.) leak at the vertical sleeve staple line, 2.) stricture in the sleeve, 3.) nausea, vomiting, and dehydration for several months, 4.) adhesions causing bowel obstruction, 5.) rapid weight loss causing a higher rate of gallstone formation during the first 6 months after surgery, 6.) decreased absorption of vitamins because of the reduced stomach size, 7.) weight regain if inappropriate food intake occurs.    The BMI that we are treating this patient for was measured at the initial consultation visit in our bariatric program and it was: 45.6 kg/m2 (as calculated just below).      The initial consult height, weight, and BMI are as follows:    Height: 187.8 cm (6' 1.94\"), wt 355 lbs, and 45.6 kg/m2     Our weight loss surgery program requires weight loss prior to bariatric surgery and currently the height, weight, and BMI are as follows:    Height: 185.4 cm (6' 1\"), Weight: 133.3 kg (293 lb 14 oz), and currently the Body mass index is 38.77 kg/m .    Due to the patient's comorbidity conditions of NAFLD, class 3 obesity, in association with elevated body mass index, bariatric surgery has been recommended and is being performed today.    Moreover, as the surgeon performing this procedure, I certify that the following are true in regards to this patient at or prior to the day of surgery:    1. The patient's body mass index (BMI) is or has " been greater than or equal to 35 kg/m2.  2. The patient has at least one co-morbidity related to obesity (as outlined above).  3. The patient has been previously unsuccessful with medical treatment for obesity.  Please note that some of this information has been documented as part of a comprehensive pre-bariatric surgery process in the outpatient clinic and is NOT immediately available in the inpatient encounter for this bariatric operation.      OPERATIVE PROCEDURE:     Abel Flores was brought to the operating room and prepared in routine fashion. Under the benefits of general anesthesia, a left upper quadrant Veress needle was inserted and pneumoperitoneum was established using carbon dioxide gas to a maximum pressure of 15 mmHg. A total of five ports were placed into the abdomen.     A liver retractor was placed through the rightmost port and this provided a view of the upper stomach. The operation was started by dividing the short gastric vessels off the greater curvature of the stomach. This dissection was carried up to the angle of His, and ligasure dissector was used for hemostasis.       A bougie (size noted above) was passed into the stomach and I used 2 blue loads and 3 white loads of the [employer] [place] flex linear cutter stapler device to create a vertical sleeve gastrectomy with the bougie as a template. The bougie was removed.    A transabdominal properitoneal anesthetic block was performed using 30 ml 0.5% bupivicaine diluted with 90 ml saline (120 mL total); this was injected using 22gauge spinal needle into the properitoneal planes around the ports and laterally along the anterior axillary line under direct optical guidance. Some of the mixture was used to inject local anesthetic at the skin of each incision as well.    The sleeve gastrectomy specimen (partial gastrectomy) was now removed from the abdomen through the 15 mm port.    Hemostasis was secured, and the liver retractor was  removed.    All ports were then removed from the abdomen.    All needle and sponge counts were correct x2 at the end of the operation, and I was present for all critical components of the procedure.     Skin incisions were closed using skin staples, and sterile dressings were placed.     Deuce Higgins MD  Surgery  [phone] (hospital )  [phone] (clinic nurses)

## 2024-05-14 NOTE — PHARMACY-CONSULT NOTE
Bariatric Consult    Medications evaluated as requested per Bariatric Consult. Patient may swallow tablets/capsules ONLY if less than   inch.  Larger tablets/capsules should be broken, crushed or split.    No medication changes were needed based on the Bariatric Medication Management Policy.    The pharmacist will continue to follow as new medications are ordered.    Cristal Rodriguez, PharmD

## 2024-05-14 NOTE — ANESTHESIA PREPROCEDURE EVALUATION
Anesthesia Pre-Procedure Evaluation    Patient: Abel Flores   MRN: [id number] : 2005        Procedure : Procedure(s):  Laparoscopic Sleeve GASTRECTOMY  possible Laparoscopic Hiatal HERNIORRHAPHY          Past Medical History:   Diagnosis Date     Morbid obesity (H)      NAFLD (nonalcoholic fatty liver disease)       No past surgical history on file.   Allergies   Allergen Reactions     Cat Hair Extract Itching and Swelling      Social History     Tobacco Use     Smoking status: Never     Passive exposure: Never     Smokeless tobacco: Never   Substance Use Topics     Alcohol use: Yes     Comment: Sometimes      Wt Readings from Last 1 Encounters:   24 134.6 kg (296 lb 12.8 oz) (>99%, Z= 3.03)*     * Growth percentiles are based on CDC (Boys, 2-20 Years) data.        Anesthesia Evaluation   Pt has not had prior anesthetic         ROS/MED HX  ENT/Pulmonary:     (+)     NASRIN risk factors, snores loudly,  obese,                                Neurologic:  - neg neurologic ROS     Cardiovascular:       METS/Exercise Tolerance: 4 - Raking leaves, gardening    Hematologic:  - neg hematologic  ROS     Musculoskeletal:  - neg musculoskeletal ROS     GI/Hepatic:     (+)      hiatal hernia,       liver disease,       Renal/Genitourinary:  - neg Renal ROS     Endo:     (+)  type II DM, Last HgA1c: 5.3,  Not using insulin, - not using insulin pump.  not previously admitted for DM/DKA.       Obesity,       Psychiatric/Substance Use:     (+) psychiatric history depression       Infectious Disease:       Malignancy:       Other:            Physical Exam    Airway        Mallampati: II   TM distance: > 3 FB   Neck ROM: full   Mouth opening: > 3 cm    Respiratory Devices and Support         Dental       (+) Completely normal teeth      Cardiovascular   cardiovascular exam normal          Pulmonary   pulmonary exam normal            OUTSIDE LABS:  CBC:   Lab Results   Component Value Date    WBC 13.1 (H) 2024     "WBC 12.7 (H) 02/08/2024    HGB 14.7 05/06/2024    HGB 16.0 02/08/2024    HCT 45.0 05/06/2024    HCT 49.3 02/08/2024     05/06/2024     02/08/2024     BMP:   Lab Results   Component Value Date     05/06/2024     02/08/2024    POTASSIUM 4.4 05/06/2024    POTASSIUM 4.7 02/08/2024    CHLORIDE 107 05/06/2024    CHLORIDE 103 02/08/2024    CO2 25 05/06/2024    CO2 28 02/08/2024    BUN 23.4 (H) 05/06/2024    BUN 13.6 02/08/2024    CR 0.89 05/06/2024    CR 0.99 02/08/2024    GLC 90 05/06/2024    GLC 88 02/08/2024     COAGS:   Lab Results   Component Value Date    INR 1.08 09/26/2023     POC: No results found for: \"BGM\", \"HCG\", \"HCGS\"  HEPATIC:   Lab Results   Component Value Date    ALBUMIN 4.5 05/06/2024    PROTTOTAL 7.7 05/06/2024    ALT 35 05/06/2024    AST 30 05/06/2024    GGT 22 06/03/2022    ALKPHOS 77 05/06/2024    BILITOTAL 0.7 05/06/2024     OTHER:   Lab Results   Component Value Date    A1C 5.3 02/08/2024    ANTOINETTE 9.8 05/06/2024       Anesthesia Plan    ASA Status:  2       Anesthesia Type: General.     - Airway: ETT   Induction: Intravenous, Propofol.   Maintenance: Balanced.   Techniques and Equipment:     - Airway: Video-Laryngoscope     - Lines/Monitors: 2nd IV     Consents    Anesthesia Plan(s) and associated risks, benefits, and realistic alternatives discussed. Questions answered and patient/representative(s) expressed understanding.     - Discussed:     - Discussed with:  Patient      - Extended Intubation/Ventilatory Support Discussed: Yes.           Postoperative Care    Pain management: IV analgesics, Oral pain medications.   PONV prophylaxis: Ondansetron (or other 5HT-3), Dexamethasone or Solumedrol     Comments:               Fredi Irene MD    I have reviewed the pertinent notes and labs in the chart from the past 30 days and (re)examined the patient.  Any updates or changes from those notes are reflected in this note.                  "

## 2024-05-14 NOTE — ANESTHESIA CARE TRANSFER NOTE
Patient: Abel Flores    Procedure: Procedure(s):  Laparoscopic Sleeve GASTRECTOMY       Diagnosis: Morbid obesity (H) [E66.01]  Diagnosis Additional Information: No value filed.    Anesthesia Type:   General     Note:      Level of Consciousness: drowsy  Oxygen Supplementation: face mask    Independent Airway: airway patency satisfactory and stable  Dentition: dentition unchanged      Patient transferred to: PACU    Handoff Report: Identifed the Patient, Identified the Reponsible Provider, Reviewed the pertinent medical history, Discussed the surgical course, Reviewed Intra-OP anesthesia mangement and issues during anesthesia, Set expectations for post-procedure period and Allowed opportunity for questions and acknowledgement of understanding      Vitals:  Vitals Value Taken Time   /106 05/14/24 0922   Temp 36.5  C (97.7  F) 05/14/24 0922   Pulse 97 05/14/24 0929   Resp 22 05/14/24 0929   SpO2 88 % 05/14/24 0929   Vitals shown include unfiled device data.    Electronically Signed By: JOELLE He CRNA  May 14, 2024  9:29 AM

## 2024-05-14 NOTE — BRIEF OP NOTE
[facility]    Brief Operative Note    Pre-operative diagnosis: Morbid obesity (H) [E66.01]  Post-operative diagnosis Same as pre-operative diagnosis    Procedure: Laparoscopic Sleeve GASTRECTOMY, N/A - Abdomen    Surgeon: Surgeons and Role:     * Deuce Higgins MD - Primary  Anesthesia: General   Estimated Blood Loss: 10ml    Drains: None  Specimens:   ID Type Source Tests Collected by Time Destination   1 : partial gastrectomy Tissue Stomach, Greater Curvature SURGICAL PATHOLOGY EXAM Deuce Higgins MD 5/14/2024  8:42 AM      Findings:   None.  Complications: None  .  Implants: * No implants in log *

## 2024-05-14 NOTE — ANESTHESIA POSTPROCEDURE EVALUATION
Patient: Abel Flores    Procedure: Procedure(s):  Laparoscopic Sleeve GASTRECTOMY       Anesthesia Type:  General    Note:  Disposition: Admission   Postop Pain Control: Uneventful            Sign Out: Well controlled pain   PONV: No   Neuro/Psych: Uneventful            Sign Out: Acceptable/Baseline neuro status   Airway/Respiratory: Uneventful            Sign Out: Acceptable/Baseline resp. status; O2 supplementation               Oxygen: Nasal Cannula (2L/min)   CV/Hemodynamics: Uneventful            Sign Out: Acceptable CV status; No obvious hypovolemia; No obvious fluid overload   Other NRE: NONE   DID A NON-ROUTINE EVENT OCCUR? No           Last vitals:  Vitals Value Taken Time   /92 05/14/24 1030   Temp 36.4  C (97.6  F) 05/14/24 1015   Pulse 89 05/14/24 1041   Resp 16 05/14/24 1041   SpO2 99 % 05/14/24 1041   Vitals shown include unfiled device data.    Electronically Signed By: Georgi Perry MD  May 14, 2024  10:42 AM

## 2024-05-14 NOTE — PLAN OF CARE
Goal Outcome Evaluation:Patient arrived to floor aprox.1500,awake C/O general area abdominal pain.Received Oxycodone.Tolerated sips water per orders without nausea.Ambulated upon arival,gait steady.Initiated IS use.  DTV, denies urgency at this time Continue to monitor

## 2024-05-15 VITALS
SYSTOLIC BLOOD PRESSURE: 130 MMHG | DIASTOLIC BLOOD PRESSURE: 74 MMHG | RESPIRATION RATE: 18 BRPM | TEMPERATURE: 98.1 F | HEIGHT: 73 IN | BODY MASS INDEX: 38.95 KG/M2 | OXYGEN SATURATION: 98 % | WEIGHT: 293.87 LBS | HEART RATE: 94 BPM

## 2024-05-15 LAB
GLUCOSE BLDC GLUCOMTR-MCNC: 108 MG/DL (ref 70–99)
HOLD SPECIMEN: NORMAL
PLATELET # BLD AUTO: 370 10E3/UL (ref 150–450)

## 2024-05-15 PROCEDURE — 36415 COLL VENOUS BLD VENIPUNCTURE: CPT | Performed by: SURGERY

## 2024-05-15 PROCEDURE — 250N000013 HC RX MED GY IP 250 OP 250 PS 637: Performed by: NURSE PRACTITIONER

## 2024-05-15 PROCEDURE — 85049 AUTOMATED PLATELET COUNT: CPT | Performed by: SURGERY

## 2024-05-15 PROCEDURE — 250N000011 HC RX IP 250 OP 636: Performed by: NURSE PRACTITIONER

## 2024-05-15 RX ORDER — MULTIVITAMIN
2 TABLET ORAL 2 TIMES DAILY
Status: SHIPPED
Start: 2024-05-15

## 2024-05-15 RX ORDER — ONDANSETRON 4 MG/1
4 TABLET, ORALLY DISINTEGRATING ORAL EVERY 6 HOURS PRN
Qty: 15 TABLET | Refills: 0 | Status: SHIPPED | OUTPATIENT
Start: 2024-05-15 | End: 2024-06-06

## 2024-05-15 RX ORDER — SCOLOPAMINE TRANSDERMAL SYSTEM 1 MG/1
1 PATCH, EXTENDED RELEASE TRANSDERMAL
Status: SHIPPED
Start: 2024-05-17 | End: 2024-06-06

## 2024-05-15 RX ORDER — AMOXICILLIN 250 MG
2 CAPSULE ORAL 2 TIMES DAILY PRN
Qty: 60 TABLET | Refills: 0 | Status: SHIPPED | OUTPATIENT
Start: 2024-05-15

## 2024-05-15 RX ORDER — OXYCODONE HYDROCHLORIDE 5 MG/1
5 TABLET ORAL EVERY 6 HOURS PRN
Qty: 12 TABLET | Refills: 0 | Status: SHIPPED | OUTPATIENT
Start: 2024-05-15 | End: 2024-06-06

## 2024-05-15 RX ORDER — ACETAMINOPHEN 325 MG/1
650 TABLET ORAL EVERY 4 HOURS PRN
Status: SHIPPED
Start: 2024-05-15

## 2024-05-15 RX ADMIN — ENOXAPARIN SODIUM 40 MG: 40 INJECTION SUBCUTANEOUS at 08:16

## 2024-05-15 RX ADMIN — DOCUSATE SODIUM 50 MG AND SENNOSIDES 8.6 MG 2 TABLET: 8.6; 5 TABLET, FILM COATED ORAL at 08:16

## 2024-05-15 RX ADMIN — OXYCODONE HYDROCHLORIDE 5 MG: 5 TABLET ORAL at 07:26

## 2024-05-15 RX ADMIN — ACETAMINOPHEN 650 MG: 325 TABLET, FILM COATED ORAL at 12:32

## 2024-05-15 RX ADMIN — OXYCODONE HYDROCHLORIDE 5 MG: 5 TABLET ORAL at 12:32

## 2024-05-15 RX ADMIN — OMEPRAZOLE 20 MG: 20 CAPSULE, DELAYED RELEASE ORAL at 08:16

## 2024-05-15 ASSESSMENT — ACTIVITIES OF DAILY LIVING (ADL)
ADLS_ACUITY_SCORE: 20
ADLS_ACUITY_SCORE: 19
ADLS_ACUITY_SCORE: 20
ADLS_ACUITY_SCORE: 19
ADLS_ACUITY_SCORE: 20

## 2024-05-15 NOTE — DISCHARGE SUMMARY
"[facility]   MIS Discharge Summary    Date of Admission: 5/14/2024  Date of Discharge: 5/15/2024    Admission Diagnosis:  1. Morbid obesity    Discharge Diagnosis:  1. Same as above  2. S/p Laparoscopic sleeve gastrectomy      Consultations:  IP pharmacy    Procedures:  1. Laparoscopic sleeve gastrectomy by Dr Gisela Marshall HPI:  Very early onset obesity who has been following with our pediatric and adolescent weight management program. Hx of DMII, NAFLD, and HTN. Treated with mounjaro preoperatively with good benefit to blood sugars, AST/ALT. Achieved weight loss from high weight in life 340lb. Has chosen to pursue Laparoscopic sleeve gastrectomy with Dr. Higgins    [facility] Course:  The patient was admitted and underwent the above procedure. The patient tolerated the procedure well. There were no complications. The patient's diet was slowly advanced as bowel function returned. Pain was controlled with oral pain medication and the patient was able to ambulate and void without difficulty. The patient received appropriate education post operatively. On POD #1 the patient was discharged to home.    Discharge Physical Exam:  BP (!) 143/86 (BP Location: Left arm)   Pulse 117   Temp 98.2  F (36.8  C) (Oral)   Resp 18   Ht 1.854 m (6' 1\")   Wt 133.3 kg (293 lb 14 oz)   SpO2 96%   BMI 38.77 kg/m      Wt Readings from Last 5 Encounters:   05/14/24 133.3 kg (293 lb 14 oz) (>99%, Z= 2.99)*   05/09/24 134.6 kg (296 lb 12.8 oz) (>99%, Z= 3.03)*   05/06/24 135.8 kg (299 lb 4.8 oz) (>99%, Z= 3.06)*   04/11/24 134.1 kg (295 lb 11.2 oz) (>99%, Z= 3.02)*   02/08/24 142.3 kg (313 lb 11.4 oz) (>99%, Z= 3.21)*     * Growth percentiles are based on CDC (Boys, 2-20 Years) data.        Gen: NAD  Lungs: non-labored breathing  CV: regular rhythm, normal rate   Abd: obese, soft, nondistended, appropriately tender, incisions are c/d/i  Ext: no peripheral edema  Neuro: AOx3    Meds:       Review of " your medicines        START taking        Dose / Directions   acetaminophen 325 MG tablet  Commonly known as: TYLENOL  Used for: S/P laparoscopic sleeve gastrectomy      Dose: 650 mg  Take 2 tablets (650 mg) by mouth every 4 hours as needed for other (For optimal non-opioid multimodal pain management to improve pain control and physical function.)  Refills: 0     hyoscyamine 0.125 MG sublingual tablet  Commonly known as: LEVSIN/SL  Used for: S/P laparoscopic sleeve gastrectomy      Dose: 125 mcg  Place 1 tablet (125 mcg) under the tongue every 4 hours as needed for cramping (spasms)  Quantity: 30 tablet  Refills: 0     Multi-vitamin per tablet  Used for: S/P laparoscopic sleeve gastrectomy  Generic drug: multivitamin w/minerals  Replaces: Multi-vitamin tablet      Dose: 2 tablet  Take 2 tablets by mouth 2 times daily Hold for 1 month postop  Refills: 0     omeprazole 20 MG DR capsule  Commonly known as: PriLOSEC  Used for: S/P laparoscopic sleeve gastrectomy      Dose: 20 mg  Take 1 capsule (20 mg) by mouth every morning (before breakfast)  Quantity: 30 capsule  Refills: 0     ondansetron 4 MG ODT tab  Commonly known as: ZOFRAN ODT  Used for: S/P laparoscopic sleeve gastrectomy      Dose: 4 mg  Take 1 tablet (4 mg) by mouth every 6 hours as needed for nausea or vomiting  Quantity: 15 tablet  Refills: 0     oxyCODONE 5 MG tablet  Commonly known as: ROXICODONE  Used for: S/P laparoscopic sleeve gastrectomy      Dose: 5 mg  Take 1 tablet (5 mg) by mouth every 6 hours as needed for moderate to severe pain  Quantity: 12 tablet  Refills: 0     scopolamine 1 MG/3DAYS 72 hr patch  Commonly known as: TRANSDERM  Used for: S/P laparoscopic sleeve gastrectomy      Dose: 1 patch  Start taking on: May 17, 2024  Place 1 patch onto the skin every 72 hours Okay to keep current patch in place at discharge  Refills: 0     senna-docusate 8.6-50 MG tablet  Commonly known as: SENOKOT-S/PERICOLACE  Used for: S/P laparoscopic sleeve  gastrectomy      Dose: 2 tablet  Take 2 tablets by mouth 2 times daily as needed for constipation  Quantity: 60 tablet  Refills: 0            CONTINUE these medicines which may have CHANGED, or have new prescriptions. If we are uncertain of the size of tablets/capsules you have at home, strength may be listed as something that might have changed.        Dose / Directions   Calcium-Vitamin D 600-5 MG-MCG Tabs  This may have changed:   medication strength  how much to take  additional instructions  Used for: S/P laparoscopic sleeve gastrectomy      Dose: 1 tablet  Take 1 tablet by mouth 2 times daily Hold for 1 month postop  Refills: 0            CONTINUE these medicines which have NOT CHANGED        Dose / Directions   clindamycin 1 % external solution  Commonly known as: CLEOCIN T      Apply topically daily as needed (acne)  Refills: 0     fexofenadine 180 MG tablet  Commonly known as: ALLEGRA      Dose: 180 mg  Take 180 mg by mouth daily as needed for allergies PRN  Refills: 0     Melatonin 10 MG Caps      Dose: 10 mg  Take 10 mg by mouth nightly as needed (sleep)  Refills: 0     Sharps Container Misc  Used for: Type 2 diabetes mellitus without complication, without long-term current use of insulin (H)      Dose: 1 Container  1 Container daily  Quantity: 1 each  Refills: 3            STOP taking      ibuprofen 200 MG tablet  Commonly known as: ADVIL/MOTRIN        Multi-vitamin tablet  Replaced by: Multi-vitamin per tablet                  Where to get your medicines        These medications were sent to [facility] [place] - [place], [place] - [address]  [address] [address]      Phone: [phone]   hyoscyamine 0.125 MG sublingual tablet  omeprazole 20 MG DR capsule  ondansetron 4 MG ODT tab  oxyCODONE 5 MG tablet  senna-docusate 8.6-50 MG tablet         Additional instructions:  After Care       Future Labs/Procedures    Activity     Comments:    Your activity upon  discharge: activity as tolerated and no lifting of more than 20lb for 4 weeks    Contact Surgeon     Comments:    Contact your Surgeon IF:  ~  Your pain is not controlled by pain medication or pain that suddenly increases;  ~  You develop a fever greater than 101 and/or chills 24 hours after surgery;  ~  You notice redness or bad smelling drainage at the surgery site;  ~  You notice a large amount of bleeding from the surgery site that does not stop when moderate pressure is applied;  ~  You are unable to pass urine within 8-10 hours after surgery;  ~  You have an upset stomach or vomiting lasting more than a day;  ~  You have a skin reaction to tape or dressing such as redness, itching or rash at the surgery site.    Diet     Comments:    Follow this diet upon discharge: bariatric full liquids    Discharge diet     Comments:    Phase II: Full liquid diet, room temperature with one protein shake per day. Goal water intake: 40oz per day by post-operative day #4. Meet with Bariatric Dietitian in 1 week to discuss dietary changes.    Do not drive     Comments:    Do NOT drive until after you have been completely off narcotics for a minimum of 24 hours.    Follow-up Care - Dietician     Comments:    Follow-up with your bariatric dietitian in 1 week.    Return to Work     Comments:    May return to work in 2 weeks if cleared by Bariatric surgeon.    Shower/Bathing     Comments:    Okay to shower. Do NOT soak in tub for 2-4 weeks.    Surgical Site Care     Comments:    If you have skin tapes on your surgical site(s), leave them on the surgical site(s) until they fall off on their own or 1 week after surgery date. May remove Band-Aid one day after surgery.    Wash your hands     Comments:    Wash your hands with soap and warm water before you touch the site of surgery.    Weight Restrictions     Comments:    Do not lift over 20 pounds for 2 weeks.                Follow Up:  -Follow up with Dr Higgins 5/23   -consider  jennifer in future   -happy to continue care in adult clinic when he is ready to transition      BARIATRIC PATIENTS:  Call [phone] to schedule or to reach your care team    GENERAL SURGERY PATIENTS: Call [phone] for scheduling needs or to reach your care team      Nichole Aden CNP  [facility] [place]

## 2024-05-15 NOTE — PLAN OF CARE
Goal Outcome Evaluation:      Plan of Care Reviewed With: patient    Overall Patient Progress: no changeOverall Patient Progress: no change    Admitted/transferred from: PACU   2 RN full   skin assessment completed by Son Case RN and Lauren CREWS RN.  Skin assessment finding: issues found 5 abdominal lap sites covered w/ primapore     Interventions/actions: skin interventions Pt educated on importance of frequent repositioning       Will continue to monitor.     A&O x4, AVSS on RA. BUE n/t baseline per pt. Denies shortness of breath or chest pain. Voiding spontaneously in bathroom. -Flatus. Up in halls walking x3. Pain managed w/ oxy x2. LR @ 75 mL/hr. 5 abdominal lap sites CDI. Continue POC

## 2024-05-15 NOTE — PHARMACY-ADMISSION MEDICATION HISTORY
Pharmacy Intern Admission Medication History    Admission medication history is complete. The information provided in this note is only as accurate as the sources available at the time of the update.    Information Source(s): Patient and CareEverywhere/SureScripts via in-person    Pertinent Information:   - Spoke with pt who was knowledgeable about his meds    Changes made to PTA medication list:  Added: None  Deleted: None  Changed:   Calcium-Vitamin D: added quantity (2 tabs) BID  Clindamycin, Allegra, ibuprofen, melatonin: added prn indication  Multivitamin: 1 tab BID --> 2 tabs BID    Allergies reviewed with patient and updates made in EHR: yes    Medication History Completed By: Ave Light 5/14/2024 7:22 PM    PTA Med List   Medication Sig Last Dose    CALCIUM-VITAMIN D PO Take 2 tablets by mouth 2 times daily 5/10/2024 at PM    clindamycin (CLEOCIN T) 1 % external solution Apply topically daily as needed (acne) 5/7/2024 at AM    fexofenadine (ALLEGRA) 180 MG tablet Take 180 mg by mouth daily as needed for allergies PRN More than a month    ibuprofen (ADVIL/MOTRIN) 200 MG tablet Take 200-400 mg by mouth every 4 hours as needed for pain 5/7/2024 at PM    Melatonin 10 MG CAPS Take 10 mg by mouth nightly as needed (sleep) 5/10/2024 at PM    multivitamin w/minerals (MULTI-VITAMIN) tablet Take 2 tablets by mouth 2 times daily 5/10/2024 at PM

## 2024-05-15 NOTE — PLAN OF CARE
Goal Outcome Evaluation:      Plan of Care Reviewed With: patient    Overall Patient Progress: improvingOverall Patient Progress: improving     Temp: 98.2  F (36.8  C) Temp src: Oral BP: (!) 143/86 Pulse: 117   Resp: 18 SpO2: 96 % O2 Device: None (Room air)     A&Ox4. Vss on RA. Lap sites w/ primapore, no new drainage. Tolerating karen clears. Minimal pain, managed w/ PO meds. Denies nausea. Voiding. Probable discharge today.

## 2024-05-15 NOTE — DISCHARGE INSTRUCTIONS
After Bariatric Surgery Discharge Instructions  [facility]   Post op medications:   Tylenol- helpful for incisional pain and generalized abdominal pain. Helpful to take at least a few times a day for the first few days. Dissolvable tylenol available at pharmacy.   Levsin (hyoscyamine)- for cramping or spasm like pain, helpful to take regularly for first few days to stay on top of pain and fluids   Zofran (ondansetron) - for nausea, take as needed   Omeprazole (Prilosec)- take daily until follow up - to prevent heart burn- open and mix with teaspoon of apple sauce   Oxycodone- take for as needed pain   Senna- stool softener- take daily until bowel movement then take as needed for constipation   Note: Ask your nurse to order your medications from the pharmacy. Be sure you have your medications with you when you leave.  Diet on discharge: bariatric full liquids.    How much fluid should I drink?  Strive for 48-64 ounces daily.  Carry a water bottle with you without a straw or sports top. Drink from it often.  Keep track of how much fluid you drink in a day.  Remember:  -Do not use straws, chew gum or suck on hard candies. They may cause painful gas.    -Sip, don't slurp when you drink.    -Practice small sips using a medicine cup for the first week postop.   -No ice or cold drinks. This could cause gas or spasms.   -No coffee, soda pop or drinks with caffeine. These may cause stomach pain.   -No alcohol. It is bad for your liver and will cause stomach pain.    How often should I do my deep breathing and coughing?  Use your incentive spirometer (small plastic breathing device) every hour while awake after you get home. Using the incentive spirometer helps you deep breath. Continuing to cough and deep breath will help prevent fevers and pneumonia.   If you do not have a fever after one week, you can stop using the incentive spirometer and discard it.     You can continue to take  deep breaths without the incentive spirometer every one to two hours while awake for the month after surgery.    What kinds of activity can I do?  Get plenty of rest the first few days after surgery and try to balance rest and activity. You will need some time to recover - you may be more tired than you realize at first. You'll feel better and heal faster if you take good care of yourself.  For 4 weeks after surgery (Please review restrictions at your one week visit, they could change based on how well you are doing):  -Don't lift more than 20 pounds.   -Take 4-5 short walks every day.  -Don't jog, run, or do belly exercises.  Don't swim, bathe or use a hot tub until your cuts are healed (scabs are gone).  You may shower  Don't plan to fly or take a road trip within the first 1 to 3 months after surgery.  You could get a blood clot in your legs. If you must travel, get up and move around every hour for at least 5 minutes before continuing on your journey.  Your care team can help you decide when it's safe for you to travel.     What can I do for pain control?  You had major belly surgery that involves all layers of your belly muscles. Pain is expected, even for some as far out as 6-8 weeks after surgery. Moving, sneezing, coughing, and breathing will cause discomfort because these activities use your belly muscles.   Please see your after visit summary medication review for what pain medication will be continued, discontinued and newly started for you.    You can take opioid pain medicine if prescribed and if needed. Try to wean off from it as soon as you feel comfortable.   Do not drive while you are taking opioid pain medicine. This is dangerous.  You can take acetaminophen (Tylenol) between your prescribed pain medicine on a scheduled basis OR take it scheduled every 6 hours (check with your care team for specifics).  -Acetaminophen formulation options:    -Liquid   -Caplet (Cut caplet in half before taking)    -Do not take more than 3000 mg Tylenol in a 24 hour period.  -You may also take Tylenol for pain in place of the opioid pain medicine (check with your care team for specifics).   You can apply ice or heat to the affected area(s). Just remember to wrap the ice in something and limit icing sessions to 20 minutes. Excessive icing can irritate the skin or cause skin damage.  You can apply heat with a hot, wet towel or heating pad. Just like cold therapy, limit heat application to 20 minutes. Never sleep with a heating pad on. It could cause severe burns to your skin.  Wear your binder to support your belly muscles if you have one.  Take this off a little more each day and try to be off completely by 2 weeks after surgery. If you don't need your binder for comfort or support, you don't need to wear it.   You may not be able to sleep in a comfortable position for a few weeks after surgery. This is normal. You may be more comfortable sleeping in a recliner or propped up with 3 pillows for the first couple of weeks after surgery.  Do not take NSAID's (Non-Steroidal Anti-Inflammatory Drugs) (examples: ibuprofen, Motrin, Advil, Aleve, and Naproxen), aspirin, or use pain patches with NSAID's. They will increase your risk of bleeding or getting an ulcer.    Please call the clinic for any of the following pain concerns, we would like to talk to you:  -pain that does not improve with rest  -pain that gets worse and worse  -pain that is not controlled by your pain medicine  -a sudden severe increase in pain    What medications will I need to take after surgery?  You may be discharged with the following types of medications: omeprazole 20 mg once daily for heartburn symptom prevention, zofran as needed for nausea and a medication called hyoscyamine (levsin) as needed for cramping.Please see your after visit summary medication review for what will be continued, discontinued and newly started.  It is important to reduce the amount  of acid in your new stomach for a couple of months after surgery while it is still healing. We will prescribe an acid reducer or antacid. Take it as directed. This will help prevent ulcers, heartburn and acid reflux.  If you took an acid reducer before you had surgery, your care team will let you know what acid reducer you will take after surgery.   It is okay to swallow any medications smaller than   inch in size.   -If it is larger than   inch, it may need to be cut, crushed, or in a liquid form. Check with your care team about which way is most appropriate for you and your medications.    What should I know about my incisions (cuts)?  Your incisions are covered with white steri strips or butterfly tape and have band aids or gauze over the top. If you have gauze or band aids, they can come off in the hospital.  Leave your steri strips on until they fall off on their own. If the steri strips don't fall off after 1 to 2 weeks, you can take them off. If they fall off earlier, replace them with clean band aids trying to avoid touching the incision itself.   If you have gauze covered by a clear dressing, remove 2-3 days after surgery or as directed by your care team.  You may shower in the hospital after surgery and can get your incision coverings wet.  Do not submerge in water (e.g. No baths, swimming pools, hot tubs) until your care team tells you it is okay and your incisions are completely healed.    Call the clinic if you have any of these signs or symptoms of infection:  -Redness around the site.  -Drainage that smells bad.  -Drainage that is thick yellow or green.  -An increase in pain around the incision site  -An increase in swelling around the incision site  -Heat or warmth around incision site   -Fever of 101.5  F (38.3  C) or higher when taken under the tongue.    -Chills    Will my urine or bowel movements change?   Your first bowel movements (stools) will likely be liquid. You may also notice old blood or  a darker color (black or maroon color) in your bowel movements.  This is not unusual and usually goes away after the first week, if not sooner. You may not have a bowel movement for a week.   If you have not had a bowel movement for at least three days after your surgery date and are passing gas, you can use over the counter stool softeners.  Please stop taking the stool softeners and laxatives if your stools are loose.  Increasing fluids and activity as well as getting off narcotics will help prevent constipation.    Call the clinic if:   -You have stomach pain.   -You continue to have constipation.  -You have excessive bloating after walking and passing gas.    How can I prevent dehydration if I feel nauseated (sick to my stomach) and vomit (throw up)?   Vomiting is not normal after surgery. If you continue to have nausea and vomiting, call the clinic.   Nausea can be a sign of dehydration. That is why it is very important to track your fluids.  Do not nap more than one hour during the day. Set a timer to wake yourself up, if needed. Too much sleep will keep you from drinking enough fluid during the day and lead to dehydration.  No outside activity in hot, humid weather until you can drink 48 to 64 ounces of fluid in 24 hours. If you sweat a lot, your body may lose too much water.  Try to take a 1 ounce sip of water (one medicine cup) every 15 minutes.  Set a timer to remind yourself.    Call the clinic if you have any of these signs or symptoms of dehydration:  -Dark colored urine  -Urinating (pass water) less than 2-3 times per day  -Lack of energy  -Nausea  -Dizziness  -Headache    Call the clinic ANY TIME at [phone] if:  -Your pain medicine is not working.  -You have a fever ? 101.5 F.  -You have belly or left shoulder pain that gets worse and worse.  -You have a swollen leg with redness, warmth, or pain behind the knee or calf.  -You have chest pain   -You feel very short of breath.  -You have a sudden  "severe increase in heart rate.  -You have vomiting that gets worse and worse.  -You have constant nausea (feeling sick to your stomach) that does not go away with medication.  -You have trouble swallowing.  -You have an increasing feeling that \"something is not right\".  -You have hiccups that do not stop.  -You have any questions or concerns.    AFTER HOURS QUESTIONS OR CONCERNS: Call [phone] and ask to speak with surgery resident if you are having troubles in the evenings, at night, or on weekends. Please call if you experience increasing abdominal pain, nausea, vomiting, increasing drainage from your wounds, chills, or fever >101.5    If you have to go to the Emergency Room, we prefer you go to the hospital that did your surgery. Please let them know that you had bariatric surgery and to notify your surgeon.    When should I go back to the clinic?  Follow up with your care team in 1-2 weeks.   If this appointment was not already made, please call: [phone]    Appointments located at [facility] clinic:  [facility]    [address]  [place] [address]    "

## 2024-05-15 NOTE — PROVIDER NOTIFICATION
"Patient discharged home following hospital stay for: Bariatric Surgery    Vital signs stable: /74 (BP Location: Left arm)   Pulse 94   Temp 98.1  F (36.7  C) (Oral)   Resp 18   Ht 1.854 m (6' 1\")   Wt 133.3 kg (293 lb 14 oz)   SpO2 98%   BMI 38.77 kg/m    Oxygen status: Stable on room air  Patient tolerating diet: Liquid diet, denies nausea.   Pain managed with 5 mg oxycodone prn and Tylenol.     Removed staples, reviewed at home care for incisions. Pt verbalized understanding.     Belongings sent with patient. IV site discontinued. Discharge meds to discharge pharmacy. AVS and education gone over with patient. Pt to follow up as outpatient and with PCP. Pt verbalized understanding of all education and information.   "

## 2024-05-16 ENCOUNTER — CARE COORDINATION (OUTPATIENT)
Dept: PEDIATRICS | Facility: CLINIC | Age: 19
End: 2024-05-16
Payer: COMMERCIAL

## 2024-05-16 NOTE — PROGRESS NOTES
RN Post-Op/Post-Discharge Care Coordination Note    Mr. Abel Flores is a 18 year old male who underwent laparoscopic gastric sleeve on 5/14/24 with Deuce Higgins MD.  Spoke with Patient.    Support  Patient able to care for self independently     Health Status  Fevers/chills: Patient denies any fever or chills.    Pain: Rates pain a 1 on a 10 Scale.  Using narcotic, oxycodone, prior to going to sleep last night.  Has not had any narcotics or pain medication today.  Encouraged non-medication management modalities: Heating pad, with barrier, to abdomen, Frequent position changes, Walking, and Ice packs, with barrier, to incisions    Nausea/Vomiting: Patient denies nausea/vomiting.    Eating/drinking: Tolerating full liquid diet. Reminded to drink small sips slowly.  Encouraged room temperature/warm fluids.    Fluid Ounces per day: Has had about 20 oz of water today plus a smoothie. Went over goal of 48-64 oz.    Cramping: a little while eating but goes away quickly.    Bowel/Bladder habits: Patient reports having a normal bowel movement., Patient reports having loose stool. , and Patient reports constipation. Last bowel movement today.  Urine normal per patient..    New Medications:  Omeprazole (opening capsules), Levsin, Zofran, Oxycodone, Tylenol, Senna, and patient was reminded not to take NSAIDS    Activity: standing, sitting, walking, and ambulating stairs    Breathing: Did the patient receive an incentive spirometer? yes.  Reminded patient to use incentive spirometer- 5 to 10 deep breaths each hour while awake.    Other symptoms: Tachycardia: no, Dizziness/lightheadedness: no, Shortness of breath, dyspnea with exertion, leg pain/swelling: no.      Drains (DAVIAN): N/A    Incisions: Patient denies any signs and symptoms of infection. and Patient denies any drainage..  Wound closure:  Steri-strips    Pathology reviewed with patient:  N/A      Activity/Restrictions  No lifting in excess of 20 pounds for 4  weeks    Forms/Letters  No. All forms should be faxed to [phone].  Does not work    Postop Appointment Reminder  May 23 at 9:30 AM confirmed with the patient:  Yes.    Additional Questions: All of his questions were answered including reviewing diet, restrictions, and wound care.  He will call this office if he has any further questions and/or concerns.        Whom and When to Call  Nurses: [phone]  Fax: [phone]     Patient advised if any concerns outside of clinic hours, to call hospital at [phone] and ask to speak to on-call bariatric resident. They should present to ED at [facility] to be assessed if urgency arises.    Risk for:  urinary tract infection, pneumonia, leg clots (deep vein thrombosis), lung clots (pulmonary emboli), injury to the bowels or other organs, bowel obstruction, hernia, and gastrointestinal bleeding.    Weight loss surgery side effects:  abdominal pain, cramping, bloating, difficulty swallowing, constipation, nausea, vomiting, diarrhea, frequent loose stools, dehydration, hair loss, excess skin, protein, iron and vitamin deficiencies, malnutrition, fatigue, and heartburn.  Bariatric surgery risks may include:  an internal leak at the staple line, narrowing of the esophagus, stomach or intestines (strictures), gallstones, bowel obstruction, inflammation of the esophagus or stomach, ulcers with or without bleeding, injuries to other organs, hernias, and iron deficiency.    Sleeve gastrectomy side effects:  leaks are more common after this procedure.  Risks include:  internal leak at the vertical sleeve staple line; nausea, vomiting, and dehydration for several months; bowel obstruction; gallstones during the first 6 months related to rapid weight loss; decreased absorption of vitamins and protein because of the reduced stomach size; weight regain if you increase food intake; narrowing of stomach, esophagus or intestines (stricture); injury to other organs; hernia;  and ulcers.

## 2024-05-20 LAB
PATH REPORT.COMMENTS IMP SPEC: NORMAL
PATH REPORT.COMMENTS IMP SPEC: NORMAL
PATH REPORT.FINAL DX SPEC: NORMAL
PATH REPORT.GROSS SPEC: NORMAL
PATH REPORT.MICROSCOPIC SPEC OTHER STN: NORMAL
PATH REPORT.RELEVANT HX SPEC: NORMAL
PHOTO IMAGE: NORMAL

## 2024-05-23 ENCOUNTER — OFFICE VISIT (OUTPATIENT)
Dept: ENDOCRINOLOGY | Facility: CLINIC | Age: 19
End: 2024-05-23
Payer: COMMERCIAL

## 2024-05-23 ENCOUNTER — VIRTUAL VISIT (OUTPATIENT)
Dept: PEDIATRICS | Facility: CLINIC | Age: 19
End: 2024-05-23
Attending: PEDIATRICS
Payer: COMMERCIAL

## 2024-05-23 VITALS
BODY MASS INDEX: 37.28 KG/M2 | HEART RATE: 78 BPM | DIASTOLIC BLOOD PRESSURE: 77 MMHG | TEMPERATURE: 97.7 F | OXYGEN SATURATION: 99 % | HEIGHT: 73 IN | WEIGHT: 281.3 LBS | SYSTOLIC BLOOD PRESSURE: 116 MMHG

## 2024-05-23 DIAGNOSIS — E11.9 TYPE 2 DIABETES MELLITUS WITHOUT COMPLICATION, WITHOUT LONG-TERM CURRENT USE OF INSULIN (H): ICD-10-CM

## 2024-05-23 DIAGNOSIS — E66.01 SEVERE OBESITY (H): Primary | ICD-10-CM

## 2024-05-23 DIAGNOSIS — E66.01 MORBID OBESITY (H): Primary | ICD-10-CM

## 2024-05-23 PROCEDURE — 99024 POSTOP FOLLOW-UP VISIT: CPT | Performed by: SURGERY

## 2024-05-23 PROCEDURE — 97803 MED NUTRITION INDIV SUBSEQ: CPT | Mod: GT,95 | Performed by: DIETITIAN, REGISTERED

## 2024-05-23 RX ORDER — URSODIOL 300 MG/1
300 CAPSULE ORAL 2 TIMES DAILY
Qty: 60 CAPSULE | Refills: 5 | Status: SHIPPED | OUTPATIENT
Start: 2024-05-23

## 2024-05-23 ASSESSMENT — PAIN SCALES - GENERAL: PAINLEVEL: NO PAIN (0)

## 2024-05-23 NOTE — PROGRESS NOTES
"Lenin is a 18 year old who is being evaluated via a billable video visit.    How would you like to obtain your AVS? MyChart  If the video visit is dropped, the invitation should be resent by: Text to cell phone: [phone]  Will anyone else be joining your video visit? No    Video-Visit Details    Type of service:  Video Visit   Start Time: 1153 am  End Time: 1207 pm  Originating Location (pt. Location): Home    Distant Location (provider location):  On-site  Platform used for Video Visit: Riley  Signed Electronically by: Amena Madera RD      PATIENT:  Abel Flores  :  2005  KARTHIK:  May 23, 2024  Medical Nutrition Therapy    GOALS  Transition to pureed diet on  - could include chicken and tuna salad, scrambled eggs etc.   Transition to soft foods as tolerated on .  Start you chewable multivitamin 2 tablets once per day.  Continue to stay active this summer - consider cardio and strength training.          Nutrition Reassessment  Abel is a 18 year old year old male who [facility] with severe obesity and type 2 diabetes. Abel was referred by Dr. Ana Rosa Akins for nutrition education and counseling.    Anthropometrics  Wt Readings from Last 4 Encounters:   24 127.6 kg (281 lb 4.8 oz) (>99%, Z= 2.84)*   24 133.3 kg (293 lb 14 oz) (>99%, Z= 2.99)*   24 134.6 kg (296 lb 12.8 oz) (>99%, Z= 3.03)*   24 135.8 kg (299 lb 4.8 oz) (>99%, Z= 3.06)*     * Growth percentiles are based on CDC (Boys, 2-20 Years) data.     Ht Readings from Last 2 Encounters:   24 1.854 m (6' 1\") (89%, Z= 1.21)*   24 1.854 m (6' 1\") (89%, Z= 1.21)*     * Growth percentiles are based on CDC (Boys, 2-20 Years) data.     Estimated body mass index is 37.11 kg/m  as calculated from the following:    Height as of an earlier encounter on 24: 1.854 m (6' 1\").    Weight as of an earlier encounter on 24: 127.6 kg (281 lb 4.8 oz).    Nutrition " History  Lenin is still on a full liquid diet. Switching between soups and shakes. He is having tomato, beef broth, chowder (pretty smooth). Will add protein to soup without protein.     Will have a protein shake with high protein milk (Fairlife), protein powder (~20 g protein),PB and banana.     Having protein yogurt as well throughout the day.     For the first couple of days he felt pressure in the stomach and gas. Less issues with discomfort as the days go on.     Waking up around 10-11 am.     Will have his nutrient containing foods as a meal. Will have 1/2 a shake and then put it away for another meal.     Sometimes feels hungry but mostly just trying to get his protein goals met. Sometimes feels tired so he feels like he needs more nutrients.     Drinking more than 64 oz fluid per day. Easier to drink water at parent's house. Waiting until between meals to drink water.     Having some constipation he thinks due to pain medications.     Will be working at [employer] in the summer. Working mornings on the weekdays. Starts cooking at 5 am.     No caffeine at this time. No carbonated beverages. Very limited refined sugars. SF popsicles and juices.     Nutritional Intakes  Breakfast: 1/2 protein shake  Am Snack: Water  Lunch: 2nd half of protein shake  PM Snack: water  Dinner: broth with protein powder; Greek yogurt   HS Snack: water  Beverages: water; protein shakes; Fairlife milk    Activity  Doing more walking recently.     Previous Goals & Progress  Continue with goal of 65 grams protein per day. After surgery, start with protein and then work toward vegetables/fruit/grains - goal met  Eat at least 3-4 times per day to ensure adequate protein intakes - goal met  Post-op fluid goal 64 oz. Make sure you are waiting at least 20 minutes after eating before drinking fluids. Avoid caffeine, straws, carbonation - goal met  Continue with full liquid diet after discharge until 14 days post-surgery then advance to [facility]  diet x 2 weeks. - goal met     Medications/Vitamins/Minerals    Current Outpatient Medications:     acetaminophen (TYLENOL) 325 MG tablet, Take 2 tablets (650 mg) by mouth every 4 hours as needed for other (For optimal non-opioid multimodal pain management to improve pain control and physical function.), Disp: , Rfl:     Calcium-Vitamin D 600-5 MG-MCG TABS, Take 1 tablet by mouth 2 times daily Hold for 1 month postop, Disp: , Rfl:     clindamycin (CLEOCIN T) 1 % external solution, Apply topically daily as needed (acne), Disp: , Rfl:     fexofenadine (ALLEGRA) 180 MG tablet, Take 180 mg by mouth daily as needed for allergies PRN, Disp: , Rfl:     hyoscyamine (LEVSIN/SL) 0.125 MG sublingual tablet, Place 1 tablet (125 mcg) under the tongue every 4 hours as needed for cramping (spasms), Disp: 30 tablet, Rfl: 0    Melatonin 10 MG CAPS, Take 10 mg by mouth nightly as needed (sleep), Disp: , Rfl:     multivitamin w/minerals (MULTI-VITAMIN) tablet, Take 2 tablets by mouth 2 times daily Hold for 1 month postop, Disp: , Rfl:     omeprazole (PRILOSEC) 20 MG DR capsule, Take 1 capsule (20 mg) by mouth every morning (before breakfast), Disp: 30 capsule, Rfl: 0    ondansetron (ZOFRAN ODT) 4 MG ODT tab, Take 1 tablet (4 mg) by mouth every 6 hours as needed for nausea or vomiting (Patient not taking: Reported on 5/23/2024), Disp: 15 tablet, Rfl: 0    oxyCODONE (ROXICODONE) 5 MG tablet, Take 1 tablet (5 mg) by mouth every 6 hours as needed for moderate to severe pain, Disp: 12 tablet, Rfl: 0    scopolamine (TRANSDERM) 1 MG/3DAYS 72 hr patch, Place 1 patch onto the skin every 72 hours Okay to keep current patch in place at discharge, Disp: , Rfl:     senna-docusate (SENOKOT-S/PERICOLACE) 8.6-50 MG tablet, Take 2 tablets by mouth 2 times daily as needed for constipation, Disp: 60 tablet, Rfl: 0    Sharps Container MISC, 1 Container daily, Disp: 1 each, Rfl: 3    ursodiol (ACTIGALL) 300 MG capsule, Take 1 capsule (300 mg) by mouth 2  times daily, Disp: 60 capsule, Rfl: 5    Nutrition Diagnosis  Obesity related to excessive energy intake as evidenced by BMI/age >95th %ile    Interventions & Education  Provided written and verbal education on the followin oz Fluid/day  Eat Protein first  Post-Op Diet Plan  Begin multivitamin/mineral supplementation  Physical activity     Monitoring/Evaluation  Will continue to monitor progress towards goals and provide education in Pediatric Weight Management.    Spent 14 minutes in consult with patient.

## 2024-05-23 NOTE — PATIENT INSTRUCTIONS
GOALS  Transition to pureed diet on 5/28 - could include chicken and tuna salad, scrambled eggs etc.   Transition to soft foods as tolerated on 6/11.  Start you chewable multivitamin 2 tablets once per day.  Continue to stay active this summer - consider cardio and strength training.

## 2024-05-23 NOTE — NURSING NOTE
"(   Chief Complaint   Patient presents with    RECHECK     Sleeve 2 week post op    )    ( Weight: 127.6 kg (281 lb 4.8 oz) )  ( Height: 185.4 cm (6' 1\") )  ( BMI (Calculated): 37.11 )  (   )  (   )  (   )  (   )  (   )  (   )    ( BP: 116/77 )  (   )  ( Temp: 97.7  F (36.5  C) )  (   )  ( Pulse: 78 )  (   )  ( SpO2: 99 % )    (   Patient Active Problem List   Diagnosis    Anxiety and depression    Prediabetes    Type 2 diabetes mellitus (H)    Physical deconditioning    Weakness of both lower extremities    Morbid obesity (H)    )  (   Current Outpatient Medications   Medication Sig Dispense Refill    acetaminophen (TYLENOL) 325 MG tablet Take 2 tablets (650 mg) by mouth every 4 hours as needed for other (For optimal non-opioid multimodal pain management to improve pain control and physical function.)      Calcium-Vitamin D 600-5 MG-MCG TABS Take 1 tablet by mouth 2 times daily Hold for 1 month postop      clindamycin (CLEOCIN T) 1 % external solution Apply topically daily as needed (acne)      fexofenadine (ALLEGRA) 180 MG tablet Take 180 mg by mouth daily as needed for allergies PRN      hyoscyamine (LEVSIN/SL) 0.125 MG sublingual tablet Place 1 tablet (125 mcg) under the tongue every 4 hours as needed for cramping (spasms) 30 tablet 0    Melatonin 10 MG CAPS Take 10 mg by mouth nightly as needed (sleep)      multivitamin w/minerals (MULTI-VITAMIN) tablet Take 2 tablets by mouth 2 times daily Hold for 1 month postop      omeprazole (PRILOSEC) 20 MG DR capsule Take 1 capsule (20 mg) by mouth every morning (before breakfast) 30 capsule 0    oxyCODONE (ROXICODONE) 5 MG tablet Take 1 tablet (5 mg) by mouth every 6 hours as needed for moderate to severe pain 12 tablet 0    scopolamine (TRANSDERM) 1 MG/3DAYS 72 hr patch Place 1 patch onto the skin every 72 hours Okay to keep current patch in place at discharge      senna-docusate (SENOKOT-S/PERICOLACE) 8.6-50 MG tablet Take 2 tablets by mouth 2 times daily as needed " for constipation 60 tablet 0    Sharps Container MISC 1 Container daily 1 each 3    ondansetron (ZOFRAN ODT) 4 MG ODT tab Take 1 tablet (4 mg) by mouth every 6 hours as needed for nausea or vomiting (Patient not taking: Reported on 5/23/2024) 15 tablet 0    )  ( Diabetes Eval:    )    ( Pain Eval:  No Pain (0) )    ( Wound Eval:       )    (   History   Smoking Status    Never   Smokeless Tobacco    Never    )    ( Signed By:  Nagi Monaco, EMT; May 23, 2024; 9:20 AM )

## 2024-05-23 NOTE — LETTER
"5/23/2024       RE: Abel Flores  [address]  [place] [address]     Dear Colleague,    Thank you for referring your patient, Abel Flores, to the [facility] [place] at [facility]. Please see a copy of my visit note below.    Postoperative bariatric surgery visit.    Patient underwent sleeve gastrectomy 1 week ago.      Tolerating liquids: yes  Lightheadedness: minimal  Abdominal pain: none  Bowel movements: normal  Fevers/shakes/chills: none    /77 (BP Location: Left arm, Patient Position: Sitting, Cuff Size: Adult Large)   Pulse 78   Temp 97.7  F (36.5  C)   Ht 1.854 m (6' 1\")   Wt 127.6 kg (281 lb 4.8 oz)   SpO2 99%   BMI 37.11 kg/m    NAD  Overall looks great  Incisions c/d/i    Plan:  1. RD visit today.  2. Start vitamin supplements per RD directions.  3. Advance diet per RD directions.  4. Follow-up: 3-4 weeks in peds-discovery clinic.  5. Ursodiol prescribed for 6 months.    Deuce Higgins MD  Surgery  [phone] (hospital )  [phone] (clinic nurses)    "

## 2024-05-23 NOTE — PROGRESS NOTES
"Postoperative bariatric surgery visit.    Patient underwent sleeve gastrectomy 1 week ago.      Tolerating liquids: yes  Lightheadedness: minimal  Abdominal pain: none  Bowel movements: normal  Fevers/shakes/chills: none    /77 (BP Location: Left arm, Patient Position: Sitting, Cuff Size: Adult Large)   Pulse 78   Temp 97.7  F (36.5  C)   Ht 1.854 m (6' 1\")   Wt 127.6 kg (281 lb 4.8 oz)   SpO2 99%   BMI 37.11 kg/m    NAD  Overall looks great  Incisions c/d/i    Plan:  1. RD visit today.  2. Start vitamin supplements per RD directions.  3. Advance diet per RD directions.  4. Follow-up: 3-4 weeks in peds-discovery clinic.  5. Ursodiol prescribed for 6 months.    Deuce Higgins MD  Surgery  [phone] (hospital )  [phone] (clinic nurses)                "

## 2024-05-23 NOTE — LETTER
"2024      RE: Abel Flores  [address]  [place] [address]     Dear Colleague,    Thank you for the opportunity to participate in the care of your patient, Abel Flores, at the [facility] at [facility]. Please see a copy of my visit note below.    Lenin is a 18 year old who is being evaluated via a billable video visit.    How would you like to obtain your AVS? MyChart  If the video visit is dropped, the invitation should be resent by: Text to cell phone: [phone]  Will anyone else be joining your video visit? No    Video-Visit Details    Type of service:  Video Visit   Start Time: 1153 am  End Time: 1207 pm  Originating Location (pt. Location): Home    Distant Location (provider location):  On-site  Platform used for Video Visit: Riley  Signed Electronically by: Amena Madera RD      PATIENT:  Abel Flores  :  2005  KARTHIK:  May 23, 2024  Medical Nutrition Therapy    GOALS  Transition to pureed diet on  - could include chicken and tuna salad, scrambled eggs etc.   Transition to soft foods as tolerated on .  Start you chewable multivitamin 2 tablets once per day.  Continue to stay active this summer - consider cardio and strength training.          Nutrition Reassessment  Abel is a 18 year old year old male who presents to Pediatric Weight Management Clinic with severe obesity and type 2 diabetes. Abel was referred by Dr. Ana Rosa Akins for nutrition education and counseling.    Anthropometrics  Wt Readings from Last 4 Encounters:   24 127.6 kg (281 lb 4.8 oz) (>99%, Z= 2.84)*   24 133.3 kg (293 lb 14 oz) (>99%, Z= 2.99)*   24 134.6 kg (296 lb 12.8 oz) (>99%, Z= 3.03)*   24 135.8 kg (299 lb 4.8 oz) (>99%, Z= 3.06)*     * Growth percentiles are based on CDC (Boys, 2-20 Years) data.     Ht Readings from Last 2 Encounters:   24 1.854 m (6' 1\") (89%, Z= " "1.21)*   05/14/24 1.854 m (6' 1\") (89%, Z= 1.21)*     * Growth percentiles are based on CDC (Boys, 2-20 Years) data.     Estimated body mass index is 37.11 kg/m  as calculated from the following:    Height as of an earlier encounter on 5/23/24: 1.854 m (6' 1\").    Weight as of an earlier encounter on 5/23/24: 127.6 kg (281 lb 4.8 oz).    Nutrition History  Lenin is still on a full liquid diet. Switching between soups and shakes. He is having tomato, beef broth, chowder (pretty smooth). Will add protein to soup without protein.     Will have a protein shake with high protein milk (Fairlife), protein powder (~20 g protein),PB and banana.     Having protein yogurt as well throughout the day.     For the first couple of days he felt pressure in the stomach and gas. Less issues with discomfort as the days go on.     Waking up around 10-11 am.     Will have his nutrient containing foods as a meal. Will have 1/2 a shake and then put it away for another meal.     Sometimes feels hungry but mostly just trying to get his protein goals met. Sometimes feels tired so he feels like he needs more nutrients.     Drinking more than 64 oz fluid per day. Easier to drink water at parent's house. Waiting until between meals to drink water.     Having some constipation he thinks due to pain medications.     Will be working at [employer] in the summer. Working mornings on the weekdays. Starts cooking at 5 am.     No caffeine at this time. No carbonated beverages. Very limited refined sugars. SF popsicles and juices.     Nutritional Intakes  Breakfast: 1/2 protein shake  Am Snack: Water  Lunch: 2nd half of protein shake  PM Snack: water  Dinner: broth with protein powder; Greek yogurt   HS Snack: water  Beverages: water; protein shakes; Fairlife milk    Activity  Doing more walking recently.     Previous Goals & Progress  Continue with goal of 65 grams protein per day. After surgery, start with protein and then work toward " vegetables/fruit/grains - goal met  Eat at least 3-4 times per day to ensure adequate protein intakes - goal met  Post-op fluid goal 64 oz. Make sure you are waiting at least 20 minutes after eating before drinking fluids. Avoid caffeine, straws, carbonation - goal met  Continue with full liquid diet after discharge until 14 days post-surgery then advance to pureed diet x 2 weeks. - goal met     Medications/Vitamins/Minerals    Current Outpatient Medications:      acetaminophen (TYLENOL) 325 MG tablet, Take 2 tablets (650 mg) by mouth every 4 hours as needed for other (For optimal non-opioid multimodal pain management to improve pain control and physical function.), Disp: , Rfl:      Calcium-Vitamin D 600-5 MG-MCG TABS, Take 1 tablet by mouth 2 times daily Hold for 1 month postop, Disp: , Rfl:      clindamycin (CLEOCIN T) 1 % external solution, Apply topically daily as needed (acne), Disp: , Rfl:      fexofenadine (ALLEGRA) 180 MG tablet, Take 180 mg by mouth daily as needed for allergies PRN, Disp: , Rfl:      hyoscyamine (LEVSIN/SL) 0.125 MG sublingual tablet, Place 1 tablet (125 mcg) under the tongue every 4 hours as needed for cramping (spasms), Disp: 30 tablet, Rfl: 0     Melatonin 10 MG CAPS, Take 10 mg by mouth nightly as needed (sleep), Disp: , Rfl:      multivitamin w/minerals (MULTI-VITAMIN) tablet, Take 2 tablets by mouth 2 times daily Hold for 1 month postop, Disp: , Rfl:      omeprazole (PRILOSEC) 20 MG DR capsule, Take 1 capsule (20 mg) by mouth every morning (before breakfast), Disp: 30 capsule, Rfl: 0     ondansetron (ZOFRAN ODT) 4 MG ODT tab, Take 1 tablet (4 mg) by mouth every 6 hours as needed for nausea or vomiting (Patient not taking: Reported on 5/23/2024), Disp: 15 tablet, Rfl: 0     oxyCODONE (ROXICODONE) 5 MG tablet, Take 1 tablet (5 mg) by mouth every 6 hours as needed for moderate to severe pain, Disp: 12 tablet, Rfl: 0     scopolamine (TRANSDERM) 1 MG/3DAYS 72 hr patch, Place 1 patch onto  the skin every 72 hours Okay to keep current patch in place at discharge, Disp: , Rfl:      senna-docusate (SENOKOT-S/PERICOLACE) 8.6-50 MG tablet, Take 2 tablets by mouth 2 times daily as needed for constipation, Disp: 60 tablet, Rfl: 0     Sharps Container MISC, 1 Container daily, Disp: 1 each, Rfl: 3     ursodiol (ACTIGALL) 300 MG capsule, Take 1 capsule (300 mg) by mouth 2 times daily, Disp: 60 capsule, Rfl: 5    Nutrition Diagnosis  Obesity related to excessive energy intake as evidenced by BMI/age >95th %ile    Interventions & Education  Provided written and verbal education on the followin oz Fluid/day  Eat Protein first  Post-Op Diet Plan  Begin multivitamin/mineral supplementation  Physical activity     Monitoring/Evaluation  Will continue to monitor progress towards goals and provide education in Pediatric Weight Management.    Spent 14 minutes in consult with patient.      Please do not hesitate to contact me if you have any questions/concerns.     Sincerely,       Amena Madera RD

## 2024-06-04 NOTE — PROGRESS NOTES
"Medical Nutrition Therapy  Nutrition Reassessment  Patient seen in Pediatric Bariatric Clinic/Pediatric Weight Management Clinic.     Anthropometrics  Age:  18 year old male   Height:  185.5 cm (6' 1.03\")  Weight:  123.8 kg (272 lb 14.9 oz)  BMI:  35.98  Weight Loss 9 lbs since last clinic visit on 5/23/24.  Anthropometrics consistent with obesity.    Initial Appt Weight (8/10/23): 161.2 kg (355 lb 6.1 oz)  Weight on Surgery date (5/14/24): 293 lb 14 oz     Allergies/Intolerances:    Cat hair extract     Nutritional History  Patient seen in [facility] for bariatric post-op nutrition follow up. Patient is about 1 month s/p laparoscopic sleeve gastrectomy. He has lost about 21 lb since surgery. Overall, patient is doing well. He has found that the emotional component to food has been a bit more difficult then he thought it would be. He reports that he is eating about 4 times a day with focus on protein. He is currently still on pureed diet. He is eating mashed potatoes (+ protein), protein shakes, scrambled eggs (blended), tomato soup (+ protein).    Patient states the he has vomited about 2 times after he ate too quickly. He will also get some dysphagia with eating too fast - will be in discomfort but eventually will go away. He is getting full very quickly and stays full for a long time after. Patient was forgetting to take his MVI for some time but decided to change the location he keeps it to the kitchen and has been more consistent every since. He is doing a great job of drinking adequate fluids - mostly water and some protein shakes. He is keeping his fluids  from his meals.       Nutritional Intakes  Sample intake includes: sleep into 10-11 or get up for work at 5 am   Breakfast:   chicken salad/ or protein shake   Am Snack:   none reported   Lunch:  packs - soup   PM Snack:  none reported     Dinner:  mashed potatoes with protein, soup or shake   HS Snack: leftover (chicken salad)     Beverages:  " water, protein chocolate milk      Activity  Going on hikes   Work his is on his own feet    Vitamin and Mineral Supplements & Medications:  Multivitamin/Mineral:  Yes - moved location and helped with consistency   Calcium with Vitamin D:  Yes  Vitamin B12:  No  Current Outpatient Medications   Medication Sig Dispense Refill    acetaminophen (TYLENOL) 325 MG tablet Take 2 tablets (650 mg) by mouth every 4 hours as needed for other (For optimal non-opioid multimodal pain management to improve pain control and physical function.)      Calcium-Vitamin D 600-5 MG-MCG TABS Take 1 tablet by mouth 2 times daily Hold for 1 month postop      clindamycin (CLEOCIN T) 1 % external solution Apply topically daily as needed (acne)      fexofenadine (ALLEGRA) 180 MG tablet Take 180 mg by mouth daily as needed for allergies PRN      hyoscyamine (LEVSIN/SL) 0.125 MG sublingual tablet Place 1 tablet (125 mcg) under the tongue every 4 hours as needed for cramping (spasms) 30 tablet 0    Melatonin 10 MG CAPS Take 10 mg by mouth nightly as needed (sleep)      multivitamin w/minerals (MULTI-VITAMIN) tablet Take 2 tablets by mouth 2 times daily Hold for 1 month postop      omeprazole (PRILOSEC) 20 MG DR capsule Take 1 capsule (20 mg) by mouth every morning (before breakfast) 30 capsule 0    ondansetron (ZOFRAN ODT) 4 MG ODT tab Take 1 tablet (4 mg) by mouth every 6 hours as needed for nausea or vomiting (Patient not taking: Reported on 5/23/2024) 15 tablet 0    oxyCODONE (ROXICODONE) 5 MG tablet Take 1 tablet (5 mg) by mouth every 6 hours as needed for moderate to severe pain 12 tablet 0    scopolamine (TRANSDERM) 1 MG/3DAYS 72 hr patch Place 1 patch onto the skin every 72 hours Okay to keep current patch in place at discharge      senna-docusate (SENOKOT-S/PERICOLACE) 8.6-50 MG tablet Take 2 tablets by mouth 2 times daily as needed for constipation 60 tablet 0    Sharps Container MISC 1 Container daily 1 each 3    ursodiol (ACTIGALL) 300  MG capsule Take 1 capsule (300 mg) by mouth 2 times daily 60 capsule 5        Previous Goals & Progress  Transition to pureed diet on  - could include chicken and tuna salad, scrambled eggs etc.  -goal met  Transition to soft foods as tolerated on . - ongoing goal   Start you chewable multivitamin 2 tablets once per day. - ongoing goal   Continue to stay active this summer - consider cardio and strength training. Ongoing goal      Nutrition Diagnosis  Obesity related to excessive energy intake as evidenced by BMI/age >95th %ile      Interventions & Education  Provided written and verbal education on the followin oz Fluid/day  Sip fluids/avoid straws/ separate from meals  Eliminate caffeinated/carbonated beverages  Avoid simple sugars/refined grains  Eat Protein first  Post-Op Diet Plan  Begin multivitamin/mineral supplementation    Reviewed previous nutrition goals and patient's progress since last appointment. Discussed patient's progress and how to transition to the next stages of the post-surgery diet moving forward - transition to soft diet  for two week and then to a regular textured diet on . Answered nutrition-related questions that pt had, and worked with him to set nutrition goals to work towards until next visit.    Goals  Reduce BMI   Transition to soft diet on  and follow for 2 weeks (-)  Transition to regular texture on   Start taking calcium supplement - continue to take MVI   Continue to focus getting adequate protein into diet      Monitoring/Evaluation  Will continue to monitor progress towards goals and provide education in Pediatric Weight Management.      Spent 30 minutes in consult with patient.     Tamica Wynne MS, RD, LD  Pager # [phone]

## 2024-06-06 ENCOUNTER — OFFICE VISIT (OUTPATIENT)
Dept: PEDIATRICS | Facility: CLINIC | Age: 19
End: 2024-06-06
Attending: PEDIATRICS
Payer: COMMERCIAL

## 2024-06-06 VITALS
DIASTOLIC BLOOD PRESSURE: 77 MMHG | HEIGHT: 73 IN | BODY MASS INDEX: 36.17 KG/M2 | SYSTOLIC BLOOD PRESSURE: 117 MMHG | WEIGHT: 272.93 LBS | HEART RATE: 78 BPM

## 2024-06-06 DIAGNOSIS — Z98.84 S/P LAPAROSCOPIC SLEEVE GASTRECTOMY: ICD-10-CM

## 2024-06-06 DIAGNOSIS — E66.01 SEVERE OBESITY (H): Primary | ICD-10-CM

## 2024-06-06 DIAGNOSIS — K76.0 HEPATIC STEATOSIS: ICD-10-CM

## 2024-06-06 DIAGNOSIS — E11.9 TYPE 2 DIABETES MELLITUS WITHOUT COMPLICATION, WITHOUT LONG-TERM CURRENT USE OF INSULIN (H): ICD-10-CM

## 2024-06-06 PROCEDURE — 97803 MED NUTRITION INDIV SUBSEQ: CPT | Performed by: DIETITIAN, REGISTERED

## 2024-06-06 PROCEDURE — 99214 OFFICE O/P EST MOD 30 MIN: CPT | Mod: GC | Performed by: PEDIATRICS

## 2024-06-06 PROCEDURE — 99213 OFFICE O/P EST LOW 20 MIN: CPT | Performed by: PEDIATRICS

## 2024-06-06 NOTE — PATIENT INSTRUCTIONS
If you have any questions or concerns:  For [facility]: Call Pediatric Weight Management Nurse Daniela Felton RN - [phone]    Start taking Ursodiol (Actigall) twice daily

## 2024-06-06 NOTE — NURSING NOTE
"[facility] [[id number]]  Chief Complaint   Patient presents with    RECHECK     Initial /77   Pulse 78   Ht 6' 1.03\" (185.5 cm)   Wt 272 lb 14.9 oz (123.8 kg)   BMI 35.98 kg/m   Estimated body mass index is 35.98 kg/m  as calculated from the following:    Height as of this encounter: 6' 1.03\" (185.5 cm).    Weight as of this encounter: 272 lb 14.9 oz (123.8 kg).  Medication Reconciliation: complete    Does the patient need any medication refills today? No    Does the patient/parent need MyChart or Proxy acces today? No            "

## 2024-06-06 NOTE — PROGRESS NOTES
Date: 2024    PATIENT:  Abel Flores  :          2005  KARTHIK:          2024    Dear Carrol Hitchcock:    I had the pleasure of seeing your patient, Abel Flores, for a visit in the [facility] on 2024 at the [facility]. Please see below for my assessment and plan of care.    As you may recall, Lenin is a 18 year old male with a BMI in the severe obesity range (defined as BMI >/ 35 kg/m2) complicated by type 2 diabetes, hepatic steatosis, and elevated BP.  He underwent laparoscopic sleeve gastrectomy on 2024 and presents today for a routine 1 mos post op visit.     Intercurrent History:  Had some mild abdominal discomfort following surgery, but this is better now.  He is noting some emotional changes surrounding the availability of food and fluids.  Realizing that he got emotional comfort from eating and now this is not an option.  Feels like he has other skills to manage emotions.    Eating:  Soft foods  About 4 times per day  Soft foods (eggs, chk salad, soup, add protein as much as possible)  At least 64 oz per day  Adds at least 20 gm protein per meal    ROS:   Fevers: no   Abdominal pain: some pressure, cramping - has improved   Nausea/vomiting: no   Constipation: with pain meds only   Diarrhea: sometimes with shakes especially   Dysphagia: sometimes food needs a minute to get down  Lightheadedness/dizziness: when standing from sitting or lying    Food intolerance: no specific   Shortness of breath: no   Chest pain: no  Heartburn: no      Medications:  - Off Mounjaro  - Forgets vitamins frequently - has moved them to kitchen to improve adherence - has MVI & Calcium chews  - has not yet started Ursodiol    Activity:  - walks/light hiking  - working at [employer]  - 15 hours now will increase soon    Sleep:  - well  - was snoring previously, unsure if he still is    Mental  Health  Misses food and being able to drink large amounts water  Mild depressive symptoms, no SI (previously notes using food to cope)    Social History:   - Lenin finished first year [employer]       - Does not drink alcohol, does not smoke tobacco or vape; not smoking THC anymore    Current Medications:  Current Outpatient Rx   Medication Sig Dispense Refill    acetaminophen (TYLENOL) 325 MG tablet Take 2 tablets (650 mg) by mouth every 4 hours as needed for other (For optimal non-opioid multimodal pain management to improve pain control and physical function.)      Calcium-Vitamin D 600-5 MG-MCG TABS Take 1 tablet by mouth 2 times daily Hold for 1 month postop      clindamycin (CLEOCIN T) 1 % external solution Apply topically daily as needed (acne)      fexofenadine (ALLEGRA) 180 MG tablet Take 180 mg by mouth daily as needed for allergies PRN      hyoscyamine (LEVSIN/SL) 0.125 MG sublingual tablet Place 1 tablet (125 mcg) under the tongue every 4 hours as needed for cramping (spasms) 30 tablet 0    Melatonin 10 MG CAPS Take 10 mg by mouth nightly as needed (sleep)      multivitamin w/minerals (MULTI-VITAMIN) tablet Take 2 tablets by mouth 2 times daily Hold for 1 month postop      omeprazole (PRILOSEC) 20 MG DR capsule Take 1 capsule (20 mg) by mouth every morning (before breakfast) 30 capsule 0    senna-docusate (SENOKOT-S/PERICOLACE) 8.6-50 MG tablet Take 2 tablets by mouth 2 times daily as needed for constipation 60 tablet 0    ursodiol (ACTIGALL) 300 MG capsule Take 1 capsule (300 mg) by mouth 2 times daily 60 capsule 5       Physical Exam:  Physical Exam   Vitals:    B/P:   BP Readings from Last 1 Encounters:   06/06/24 117/77     P:   Pulse Readings from Last 1 Encounters:   06/06/24 78       Measured Weights:  Wt Readings from Last 4 Encounters:   06/06/24 123.8 kg (272 lb 14.9 oz) (>99%, Z= 2.72)*   05/23/24 127.6 kg (281 lb 4.8 oz) (>99%, Z= 2.84)*   05/14/24 133.3 kg (293 lb 14 oz) (>99%, Z= 2.99)*  "  05/09/24 134.6 kg (296 lb 12.8 oz) (>99%, Z= 3.03)*     * Growth percentiles are based on CDC (Boys, 2-20 Years) data.       Height:    Ht Readings from Last 4 Encounters:   06/06/24 1.855 m (6' 1.03\") (89%, Z= 1.22)*   05/23/24 1.854 m (6' 1\") (89%, Z= 1.21)*   05/14/24 1.854 m (6' 1\") (89%, Z= 1.21)*   05/06/24 1.877 m (6' 1.9\") (94%, Z= 1.53)*     * Growth percentiles are based on CDC (Boys, 2-20 Years) data.     Body Mass Index:  Body mass index is 35.98 kg/m .    Labs:    No results found for any visits on 06/06/24.   Latest Reference Range & Units 02/08/24 13:27   Hemoglobin A1C <5.7 % 5.3     PreOpLabs:   Latest Reference Range & Units 05/06/24 09:15   Sodium 135 - 145 mmol/L 143   Potassium 3.4 - 5.3 mmol/L 4.4   Chloride 98 - 107 mmol/L 107   Carbon Dioxide (CO2) 22 - 29 mmol/L 25   Urea Nitrogen 6.0 - 20.0 mg/dL 23.4 (H)   Creatinine 0.67 - 1.17 mg/dL 0.89   GFR Estimate >60 mL/min/1.73m2 >90   Calcium 8.6 - 10.0 mg/dL 9.8   Anion Gap 7 - 15 mmol/L 11   Albumin 3.5 - 5.2 g/dL 4.5   Protein Total 6.4 - 8.3 g/dL 7.7   Alkaline Phosphatase 65 - 260 U/L 77   ALT 0 - 50 U/L 35   AST 0 - 35 U/L 30   Bilirubin Total <=1.2 mg/dL 0.7   Glucose 70 - 99 mg/dL 90   WBC 4.0 - 11.0 10e3/uL 13.1 (H)   Hemoglobin 13.3 - 17.7 g/dL 14.7   Hematocrit 40.0 - 53.0 % 45.0   Platelet Count 150 - 450 10e3/uL 324   RBC Count 4.40 - 5.90 10e6/uL 5.10   MCV 78 - 100 fL 88   MCH 26.5 - 33.0 pg 28.8   MCHC 31.5 - 36.5 g/dL 32.7   RDW 10.0 - 15.0 % 13.2   (H): Data is abnormally high     Assessment:  Abel \"Lenin\" is a 18 year old with class 3 obesity complicated by type 2 diabetes, hepatic steatosis, and elevated BP.  Lenin is now 1 mos s/p LSG (DOS 5/14/2024) and presents today for a routine post-op visit.  He is tolerating a soft diet and is not having any side effects.  Post op course thus far has been uncomplicated.       Abel s current problem list reviewed today includes:    Encounter Diagnoses   Name Primary?    Severe " obesity (H) Yes    Type 2 diabetes mellitus without complication, without long-term current use of insulin (H)     Hepatic steatosis     S/P laparoscopic sleeve gastrectomy         Care Plan:  Severe Obesity: max BMI 45.64 and 355 pounds (8/10/23)  - PreOp: BMI 38.5 296 pounds  - Today BMI 36 272 pounds  8.1% weight loss since surgery 1 month ago  - RD appointment today   - Start ursodiol 300 mg bid  - Take supplements:  1. Multivitamin/minerals with iron, once a day, orally.  (no iron needed for male patients)  2. Calcium Citrate 500mg with vitamin D, three times a day, orally.  3. Vitamin B12 500mcg per day SL (preferred) or 1000mcg/ml, 1ml injection monthly IM/SC, 1 ml vial, #12. Include  3 ml syringe with 1/2  27 gauge needle, dispense with each dose  in the prescription.  4. If vitamin B12 is stable, can switch to a B-complex vitamin at 1 year.      Type 2 Diabetes: Hgb A1c 5.3%; [facility]    - In remission    Hepatic Steatosis: GI consultation done  - ALT normalized prior to surgery (35)  - Last US: 2/15/2023      Elevated BP  - Normalized    We are looking forward to seeing Lenin for a follow up in 2 months.    Thank you for including me in the care of your patient.  Please do not hesitate to call with questions or concerns.    Sincerely,    Mariya Kruse MD  Pediatric Obesity Medicine Fellow  [facility] Department of Pediatrics    Physician Attestation   I, Coral Tim MD, MD, saw this patient and agree with the findings and plan of care as documented in the note.      Items personally reviewed/procedural attestation: vitals, labs, and florence history.    Coral Tim MD, MD    Coral Tim MD, MPH   of Pediatrics  Diplomate of American Board of Obesity Medicine  Medical Director, Pediatric Weight Management Clinic          CC  Copy to patient  Huong Flores Frank  [address]  [place] [address]

## 2024-06-06 NOTE — LETTER
2024      RE: Abel Flores  [address]  [place] [address]     Dear Colleague,    Thank you for the opportunity to participate in the care of your patient, Abel Flores, at the [facility] at [facility]. Please see a copy of my visit note below.          Date: 2024    PATIENT:  Abel Flores  :          2005  KARTHIK:          2024    Dear Carrol Hitchcock:    I had the pleasure of seeing your patient, Abel Flores, for a visit in the [facility] on 2024 at the [facility]. Please see below for my assessment and plan of care.    As you may recall, Lenin is a 18 year old male with a BMI in the severe obesity range (defined as BMI >/ 35 kg/m2) complicated by type 2 diabetes, hepatic steatosis, and elevated BP.  He underwent laparoscopic sleeve gastrectomy on 2024 and presents today for a routine 1 mos post op visit.     Intercurrent History:  Had some mild abdominal discomfort following surgery, but this is better now.  He is noting some emotional changes surrounding the availability of food and fluids.  Realizing that he got emotional comfort from eating and now this is not an option.  Feels like he has other skills to manage emotions.    Eating:  Soft foods  About 4 times per day  Soft foods (eggs, chk salad, soup, add protein as much as possible)  At least 64 oz per day  Adds at least 20 gm protein per meal    ROS:   Fevers: no   Abdominal pain: some pressure, cramping - has improved   Nausea/vomiting: no   Constipation: with pain meds only   Diarrhea: sometimes with shakes especially   Dysphagia: sometimes food needs a minute to get down  Lightheadedness/dizziness: when standing from sitting or lying    Food intolerance: no specific   Shortness of breath: no   Chest pain:  no  Heartburn: no      Medications:  - Off Mounjaro  - Forgets vitamins frequently - has moved them to kitchen to improve adherence - has MVI & Calcium chews  - has not yet started Ursodiol    Activity:  - walks/light hiking  - working at [employer]  - 15 hours now will increase soon    Sleep:  - well  - was snoring previously, unsure if he still is    Mental Health  Misses food and being able to drink large amounts water  Mild depressive symptoms, no SI (previously notes using food to cope)    Social History:   - Lenin finished first year [employer]       - Does not drink alcohol, does not smoke tobacco or vape; not smoking THC anymore    Current Medications:  Current Outpatient Rx   Medication Sig Dispense Refill     acetaminophen (TYLENOL) 325 MG tablet Take 2 tablets (650 mg) by mouth every 4 hours as needed for other (For optimal non-opioid multimodal pain management to improve pain control and physical function.)       Calcium-Vitamin D 600-5 MG-MCG TABS Take 1 tablet by mouth 2 times daily Hold for 1 month postop       clindamycin (CLEOCIN T) 1 % external solution Apply topically daily as needed (acne)       fexofenadine (ALLEGRA) 180 MG tablet Take 180 mg by mouth daily as needed for allergies PRN       hyoscyamine (LEVSIN/SL) 0.125 MG sublingual tablet Place 1 tablet (125 mcg) under the tongue every 4 hours as needed for cramping (spasms) 30 tablet 0     Melatonin 10 MG CAPS Take 10 mg by mouth nightly as needed (sleep)       multivitamin w/minerals (MULTI-VITAMIN) tablet Take 2 tablets by mouth 2 times daily Hold for 1 month postop       omeprazole (PRILOSEC) 20 MG DR capsule Take 1 capsule (20 mg) by mouth every morning (before breakfast) 30 capsule 0     senna-docusate (SENOKOT-S/PERICOLACE) 8.6-50 MG tablet Take 2 tablets by mouth 2 times daily as needed for constipation 60 tablet 0     ursodiol (ACTIGALL) 300 MG capsule Take 1 capsule (300 mg) by mouth 2 times daily 60 capsule 5       Physical  "Exam:  Physical Exam   Vitals:    B/P:   BP Readings from Last 1 Encounters:   06/06/24 117/77     P:   Pulse Readings from Last 1 Encounters:   06/06/24 78       Measured Weights:  Wt Readings from Last 4 Encounters:   06/06/24 123.8 kg (272 lb 14.9 oz) (>99%, Z= 2.72)*   05/23/24 127.6 kg (281 lb 4.8 oz) (>99%, Z= 2.84)*   05/14/24 133.3 kg (293 lb 14 oz) (>99%, Z= 2.99)*   05/09/24 134.6 kg (296 lb 12.8 oz) (>99%, Z= 3.03)*     * Growth percentiles are based on CDC (Boys, 2-20 Years) data.       Height:    Ht Readings from Last 4 Encounters:   06/06/24 1.855 m (6' 1.03\") (89%, Z= 1.22)*   05/23/24 1.854 m (6' 1\") (89%, Z= 1.21)*   05/14/24 1.854 m (6' 1\") (89%, Z= 1.21)*   05/06/24 1.877 m (6' 1.9\") (94%, Z= 1.53)*     * Growth percentiles are based on CDC (Boys, 2-20 Years) data.     Body Mass Index:  Body mass index is 35.98 kg/m .    Labs:    No results found for any visits on 06/06/24.   Latest Reference Range & Units 02/08/24 13:27   Hemoglobin A1C <5.7 % 5.3     PreOpLabs:   Latest Reference Range & Units 05/06/24 09:15   Sodium 135 - 145 mmol/L 143   Potassium 3.4 - 5.3 mmol/L 4.4   Chloride 98 - 107 mmol/L 107   Carbon Dioxide (CO2) 22 - 29 mmol/L 25   Urea Nitrogen 6.0 - 20.0 mg/dL 23.4 (H)   Creatinine 0.67 - 1.17 mg/dL 0.89   GFR Estimate >60 mL/min/1.73m2 >90   Calcium 8.6 - 10.0 mg/dL 9.8   Anion Gap 7 - 15 mmol/L 11   Albumin 3.5 - 5.2 g/dL 4.5   Protein Total 6.4 - 8.3 g/dL 7.7   Alkaline Phosphatase 65 - 260 U/L 77   ALT 0 - 50 U/L 35   AST 0 - 35 U/L 30   Bilirubin Total <=1.2 mg/dL 0.7   Glucose 70 - 99 mg/dL 90   WBC 4.0 - 11.0 10e3/uL 13.1 (H)   Hemoglobin 13.3 - 17.7 g/dL 14.7   Hematocrit 40.0 - 53.0 % 45.0   Platelet Count 150 - 450 10e3/uL 324   RBC Count 4.40 - 5.90 10e6/uL 5.10   MCV 78 - 100 fL 88   MCH 26.5 - 33.0 pg 28.8   MCHC 31.5 - 36.5 g/dL 32.7   RDW 10.0 - 15.0 % 13.2   (H): Data is abnormally high     Assessment:  Abel \"Lenin\" is a 18 year old with class 3 obesity complicated " by type 2 diabetes, hepatic steatosis, and elevated BP.  Lenin is now 1 mos s/p LSG (DOS 5/14/2024) and presents today for a routine post-op visit.  He is tolerating a soft diet and is not having any side effects.  Post op course thus far has been uncomplicated.       Abel montoya current problem list reviewed today includes:    Encounter Diagnoses   Name Primary?     Severe obesity (H) Yes     Type 2 diabetes mellitus without complication, without long-term current use of insulin (H)      Hepatic steatosis      S/P laparoscopic sleeve gastrectomy         Care Plan:  Severe Obesity: max BMI 45.64 and 355 pounds (8/10/23)  - PreOp: BMI 38.5 296 pounds  - Today BMI 36 272 pounds  8.1% weight loss since surgery 1 month ago  - RD appointment today   - Start ursodiol 300 mg bid  - Take supplements:  1. Multivitamin/minerals with iron, once a day, orally.  (no iron needed for male patients)  2. Calcium Citrate 500mg with vitamin D, three times a day, orally.  3. Vitamin B12 500mcg per day SL (preferred) or 1000mcg/ml, 1ml injection monthly IM/SC, 1 ml vial, #12. Include  3 ml syringe with 1/2  27 gauge needle, dispense with each dose  in the prescription.  4. If vitamin B12 is stable, can switch to a B-complex vitamin at 1 year.      Type 2 Diabetes: Hgb A1c 5.3%; [facility]    - In remission    Hepatic Steatosis: GI consultation done  - ALT normalized prior to surgery (35)  - Last US: 2/15/2023      Elevated BP  - Normalized    We are looking forward to seeing Lenin for a follow up in 2 months.    Thank you for including me in the care of your patient.  Please do not hesitate to call with questions or concerns.    Sincerely,    Mariya Kruse MD  Pediatric Obesity Medicine Fellow  [facility] Department of Pediatrics    Physician Attestation  I, Coral Tim MD, MD, saw this patient and agree with the findings and plan of care as documented in the note.      Items  personally reviewed/procedural attestation: vitals, labs, and florence history.    Coral Tim MD, MD    Coral Tim MD, MPH   of Pediatrics  Diplomate of American Board of Obesity Medicine  Medical Director, Pediatric Weight Management Clinic          CC  Copy to patient  Huong Flores Frank  [address]  [place] [address]

## 2024-06-06 NOTE — LETTER
"6/6/2024      RE: Abel Flores  [address]  [place] [address]     Dear Colleague,    Thank you for the opportunity to participate in the care of your patient, Abel Flores, at the [facility] at [facility]. Please see a copy of my visit note below.    Medical Nutrition Therapy  Nutrition Reassessment  Patient seen in Pediatric Bariatric Clinic/Pediatric Weight Management Clinic.     Anthropometrics  Age:  18 year old male   Height:  185.5 cm (6' 1.03\")  Weight:  123.8 kg (272 lb 14.9 oz)  BMI:  35.98  Weight Loss 9 lbs since last clinic visit on 5/23/24.  Anthropometrics consistent with obesity.    Initial Appt Weight (8/10/23): 161.2 kg (355 lb 6.1 oz)  Weight on Surgery date (5/14/24): 293 lb 14 oz     Allergies/Intolerances:    Cat hair extract     Nutritional History  Patient seen in [facility] for bariatric post-op nutrition follow up. Patient is about 1 month s/p laparoscopic sleeve gastrectomy. He has lost about 21 lb since surgery. Overall, patient is doing well. He has found that the emotional component to food has been a bit more difficult then he thought it would be. He reports that he is eating about 4 times a day with focus on protein. He is currently still on pureed diet. He is eating mashed potatoes (+ protein), protein shakes, scrambled eggs (blended), tomato soup (+ protein).    Patient states the he has vomited about 2 times after he ate too quickly. He will also get some dysphagia with eating too fast - will be in discomfort but eventually will go away. He is getting full very quickly and stays full for a long time after. Patient was forgetting to take his MVI for some time but decided to change the location he keeps it to the kitchen and has been more consistent every since. He is doing a great job of drinking adequate fluids - mostly water and some protein shakes. He is keeping his fluids "  from his meals.       Nutritional Intakes  Sample intake includes: sleep into 10-11 or get up for work at 5 am   Breakfast:   chicken salad/ or protein shake   Am Snack:   none reported   Lunch:  packs - soup   PM Snack:  none reported     Dinner:  mashed potatoes with protein, soup or shake   HS Snack: leftover (chicken salad)     Beverages:  water, protein chocolate milk      Activity  Going on hikes   Work his is on his own feet    Vitamin and Mineral Supplements & Medications:  Multivitamin/Mineral:  Yes - moved location and helped with consistency   Calcium with Vitamin D:  Yes  Vitamin B12:  No  Current Outpatient Medications   Medication Sig Dispense Refill     acetaminophen (TYLENOL) 325 MG tablet Take 2 tablets (650 mg) by mouth every 4 hours as needed for other (For optimal non-opioid multimodal pain management to improve pain control and physical function.)       Calcium-Vitamin D 600-5 MG-MCG TABS Take 1 tablet by mouth 2 times daily Hold for 1 month postop       clindamycin (CLEOCIN T) 1 % external solution Apply topically daily as needed (acne)       fexofenadine (ALLEGRA) 180 MG tablet Take 180 mg by mouth daily as needed for allergies PRN       hyoscyamine (LEVSIN/SL) 0.125 MG sublingual tablet Place 1 tablet (125 mcg) under the tongue every 4 hours as needed for cramping (spasms) 30 tablet 0     Melatonin 10 MG CAPS Take 10 mg by mouth nightly as needed (sleep)       multivitamin w/minerals (MULTI-VITAMIN) tablet Take 2 tablets by mouth 2 times daily Hold for 1 month postop       omeprazole (PRILOSEC) 20 MG DR capsule Take 1 capsule (20 mg) by mouth every morning (before breakfast) 30 capsule 0     ondansetron (ZOFRAN ODT) 4 MG ODT tab Take 1 tablet (4 mg) by mouth every 6 hours as needed for nausea or vomiting (Patient not taking: Reported on 5/23/2024) 15 tablet 0     oxyCODONE (ROXICODONE) 5 MG tablet Take 1 tablet (5 mg) by mouth every 6 hours as needed for moderate to severe pain 12  tablet 0     scopolamine (TRANSDERM) 1 MG/3DAYS 72 hr patch Place 1 patch onto the skin every 72 hours Okay to keep current patch in place at discharge       senna-docusate (SENOKOT-S/PERICOLACE) 8.6-50 MG tablet Take 2 tablets by mouth 2 times daily as needed for constipation 60 tablet 0     Sharps Container MISC 1 Container daily 1 each 3     ursodiol (ACTIGALL) 300 MG capsule Take 1 capsule (300 mg) by mouth 2 times daily 60 capsule 5        Previous Goals & Progress  Transition to pureed diet on  - could include chicken and tuna salad, scrambled eggs etc.  -goal met  Transition to soft foods as tolerated on . - ongoing goal   Start you chewable multivitamin 2 tablets once per day. - ongoing goal   Continue to stay active this summer - consider cardio and strength training. Ongoing goal      Nutrition Diagnosis  Obesity related to excessive energy intake as evidenced by BMI/age >95th %ile      Interventions & Education  Provided written and verbal education on the followin oz Fluid/day  Sip fluids/avoid straws/ separate from meals  Eliminate caffeinated/carbonated beverages  Avoid simple sugars/refined grains  Eat Protein first  Post-Op Diet Plan  Begin multivitamin/mineral supplementation    Reviewed previous nutrition goals and patient's progress since last appointment. Discussed patient's progress and how to transition to the next stages of the post-surgery diet moving forward - transition to soft diet  for two week and then to a regular textured diet on . Answered nutrition-related questions that pt had, and worked with him to set nutrition goals to work towards until next visit.    Goals  Reduce BMI   Transition to soft diet on  and follow for 2 weeks (-)  Transition to regular texture on   Start taking calcium supplement - continue to take MVI   Continue to focus getting adequate protein into diet      Monitoring/Evaluation  Will continue to monitor progress towards  goals and provide education in Pediatric Weight Management.      Spent 30 minutes in consult with patient.     Tamica Wynne MS, DAMIEN, LD  Pager # [phone]      Please do not hesitate to contact me if you have any questions/concerns.     Sincerely,       Tamica Wynne RD

## 2024-06-09 PROBLEM — K76.0 HEPATIC STEATOSIS: Status: ACTIVE | Noted: 2024-06-09

## 2024-06-09 PROBLEM — Z98.84 S/P LAPAROSCOPIC SLEEVE GASTRECTOMY: Status: ACTIVE | Noted: 2024-06-09

## 2024-07-30 ENCOUNTER — TELEPHONE (OUTPATIENT)
Dept: PEDIATRICS | Facility: CLINIC | Age: 19
End: 2024-07-30
Payer: COMMERCIAL

## 2024-07-30 NOTE — TELEPHONE ENCOUNTER
Called and spoke to Lenin. Confirmed appointments on 8/1/24. Lenin had no other questions at this time.

## 2024-07-31 NOTE — PROGRESS NOTES
Date: 2024    PATIENT:  Abel Flores  :          2005  KARTHIK:          Aug 1, 2024    Dear Carrol Hitchcock:    I had the pleasure of seeing your patient, Abel Flores, for a visit in the [facility] on Aug 1, 2024 at the [facility]. Please see below for my assessment and plan of care.    As you may recall, Lenin is a 19 year old male with a BMI in the severe obesity range (defined as BMI >/ 35 kg/m2) complicated by type 2 diabetes, hepatic steatosis, and elevated BP.  He underwent laparoscopic sleeve gastrectomy on 2024 and presents today for a routine post-operative appointment.      Intercurrent History:  Had some mild abdominal discomfort following surgery, but this is better now.  He is noting some emotional changes surrounding the availability of food and fluids.  Realizing that he got emotional comfort from eating and now this is not an option.  Feels like he has other skills to manage emotions.    ROS:   Abdominal pain: with more dense foods, usually at meals vs lighter snacks   Nausea/vomiting: if eating too much    Constipation: none    Diarrhea: none    Dysphagia: can feel dense foods moving down his esophagus  Lightheadedness/dizziness: sometimes after a long day and standing up too quickly   Food intolerance: no specific   Shortness of breath: no   Chest pain: no  Heartburn: no      Medications:  - Off Mounjaro  - Taking MVI and calcium twice daily   - Not taking ursodiol as there were issues with getting it from the pharmacy     Activity:  - walks/light hiking - can go on longer walks now; not walking as much as he did at the beginning of the summer with work being busier    - working at [employer] - does not enjoy his job currently    Mental Health  Misses food and being able to drink large amounts water  He's excited to get back to college     Social History:   - Lenin finished  first year [employer] - major in professional communication and emerging media     - Does not drink alcohol, does not smoke tobacco or vape; not smoking THC anymore    Current Medications:  Current Outpatient Rx   Medication Sig Dispense Refill    acetaminophen (TYLENOL) 325 MG tablet Take 2 tablets (650 mg) by mouth every 4 hours as needed for other (For optimal non-opioid multimodal pain management to improve pain control and physical function.)      Calcium-Vitamin D 600-5 MG-MCG TABS Take 1 tablet by mouth 2 times daily Hold for 1 month postop      clindamycin (CLEOCIN T) 1 % external solution Apply topically daily as needed (acne)      fexofenadine (ALLEGRA) 180 MG tablet Take 180 mg by mouth daily as needed for allergies PRN      hyoscyamine (LEVSIN/SL) 0.125 MG sublingual tablet Place 1 tablet (125 mcg) under the tongue every 4 hours as needed for cramping (spasms) 30 tablet 0    Melatonin 10 MG CAPS Take 10 mg by mouth nightly as needed (sleep)      multivitamin w/minerals (MULTI-VITAMIN) tablet Take 2 tablets by mouth 2 times daily Hold for 1 month postop      omeprazole (PRILOSEC) 20 MG DR capsule Take 1 capsule (20 mg) by mouth every morning (before breakfast) 30 capsule 0    senna-docusate (SENOKOT-S/PERICOLACE) 8.6-50 MG tablet Take 2 tablets by mouth 2 times daily as needed for constipation 60 tablet 0    ursodiol (ACTIGALL) 300 MG capsule Take 1 capsule (300 mg) by mouth 2 times daily 60 capsule 5       Physical Exam:  Physical Exam   Vitals:    B/P:   BP Readings from Last 1 Encounters:   08/01/24 120/70     P:   Pulse Readings from Last 1 Encounters:   08/01/24 66       Measured Weights:  Wt Readings from Last 4 Encounters:   08/01/24 111.4 kg (245 lb 9.5 oz)   06/06/24 123.8 kg (272 lb 14.9 oz) (>99%, Z= 2.72)*   05/23/24 127.6 kg (281 lb 4.8 oz) (>99%, Z= 2.84)*   05/14/24 133.3 kg (293 lb 14 oz) (>99%, Z= 2.99)*     * Growth percentiles are based on CDC (Boys, 2-20 Years) data.       Height:    Ht  "Readings from Last 4 Encounters:   08/01/24 1.872 m (6' 1.7\")   06/06/24 1.855 m (6' 1.03\") (89%, Z= 1.22)*   05/23/24 1.854 m (6' 1\") (89%, Z= 1.21)*   05/14/24 1.854 m (6' 1\") (89%, Z= 1.21)*     * Growth percentiles are based on CDC (Boys, 2-20 Years) data.     Body Mass Index:  Body mass index is 31.79 kg/m .    Labs:    No results found for any visits on 08/01/24.   Latest Reference Range & Units 02/08/24 13:27   Hemoglobin A1C <5.7 % 5.3     PreOpLabs:   Latest Reference Range & Units 05/06/24 09:15   Sodium 135 - 145 mmol/L 143   Potassium 3.4 - 5.3 mmol/L 4.4   Chloride 98 - 107 mmol/L 107   Carbon Dioxide (CO2) 22 - 29 mmol/L 25   Urea Nitrogen 6.0 - 20.0 mg/dL 23.4 (H)   Creatinine 0.67 - 1.17 mg/dL 0.89   GFR Estimate >60 mL/min/1.73m2 >90   Calcium 8.6 - 10.0 mg/dL 9.8   Anion Gap 7 - 15 mmol/L 11   Albumin 3.5 - 5.2 g/dL 4.5   Protein Total 6.4 - 8.3 g/dL 7.7   Alkaline Phosphatase 65 - 260 U/L 77   ALT 0 - 50 U/L 35   AST 0 - 35 U/L 30   Bilirubin Total <=1.2 mg/dL 0.7   Glucose 70 - 99 mg/dL 90   WBC 4.0 - 11.0 10e3/uL 13.1 (H)   Hemoglobin 13.3 - 17.7 g/dL 14.7   Hematocrit 40.0 - 53.0 % 45.0   Platelet Count 150 - 450 10e3/uL 324   RBC Count 4.40 - 5.90 10e6/uL 5.10   MCV 78 - 100 fL 88   MCH 26.5 - 33.0 pg 28.8   MCHC 31.5 - 36.5 g/dL 32.7   RDW 10.0 - 15.0 % 13.2   (H): Data is abnormally high     Assessment:  Abel \"Lenin\" is a 18 year old with class 3 obesity complicated by type 2 diabetes, hepatic steatosis, and elevated BP.  Lenin is now 2.5 mos s/p LSG (DOS 5/14/2024) and presents today for a routine post-op visit.  He is tolerating a soft diet and is not having any side effects. Weight on the day of surgery was 293 lbs and weight today is 245 lbs, reflecting a 48 lb weight loss (17%). Overall weight loss has been 110 lbs. Discussed continuing routine post-operative cares. Recommended careful chewing of complex textures (ex: meat) to help with sensation of food moving through esophagus. Only " 2.5 months post-op and these issues with food intolerance may continue to improve with time.         Abel montoya current problem list reviewed today includes:    Encounter Diagnoses   Name Primary?    Severe obesity (H) Yes    Hepatic steatosis     Type 2 diabetes mellitus without complication, without long-term current use of insulin (H)     S/P laparoscopic sleeve gastrectomy        Care Plan:  Severe Obesity: max BMI 45.64 and 355 pounds (8/10/23)  - PreOp: BMI 38.5 kg/m2; weight: 296 pounds  - Today: BMI 32 kg/m2; 245 pounds; 17 % weight loss  - RD and psychology appointments today as part of bariatric clinic   - Recommend ursodiol 300 mg bid for gallstone prophylaxis    - Continue routine post-operative cares, including daily supplements   - Routine post-operative labs - ordered today, Lenin unable to stay for labs (had to leave for a work shift), changed to future lab orders      Type 2 Diabetes: Hgb A1c 5.3%; antibody testing done at [facility] - negative    - In remission - recheck Hgb A1c with post-op labs      Hepatic Steatosis: GI consultation done; biopsy not done   - ALT normalized prior to surgery (35 U/L)  - Last US: 2/15/2023      Elevated BP  - Normalized    We are looking forward to seeing Lenin for a follow up in 3 months.     30 minutes spent by me on the date of the encounter doing chart review, patient visit, documentation, and discussion with other provider(s)     Thank you for including me in the care of your patient.  Please do not hesitate to call with questions or concerns.    Sincerely,    Ana Rosa Akins MD, MS   American Board of Obesity Medicine Diplomate      Department of Pediatrics   [facility]             CC  Copy to patient  Huong Flores Frank  [address]  [place] [address]

## 2024-08-01 ENCOUNTER — OFFICE VISIT (OUTPATIENT)
Dept: PEDIATRICS | Facility: CLINIC | Age: 19
End: 2024-08-01
Attending: PEDIATRICS
Payer: COMMERCIAL

## 2024-08-01 VITALS
BODY MASS INDEX: 31.52 KG/M2 | HEIGHT: 74 IN | HEART RATE: 66 BPM | SYSTOLIC BLOOD PRESSURE: 120 MMHG | WEIGHT: 245.59 LBS | DIASTOLIC BLOOD PRESSURE: 70 MMHG

## 2024-08-01 DIAGNOSIS — Z98.84 STATUS POST BARIATRIC SURGERY: ICD-10-CM

## 2024-08-01 DIAGNOSIS — E11.9 TYPE 2 DIABETES MELLITUS WITHOUT COMPLICATION, WITHOUT LONG-TERM CURRENT USE OF INSULIN (H): ICD-10-CM

## 2024-08-01 DIAGNOSIS — Z98.84 S/P LAPAROSCOPIC SLEEVE GASTRECTOMY: Primary | ICD-10-CM

## 2024-08-01 DIAGNOSIS — Z98.84 S/P LAPAROSCOPIC SLEEVE GASTRECTOMY: ICD-10-CM

## 2024-08-01 DIAGNOSIS — E66.01 SEVERE OBESITY (H): Primary | ICD-10-CM

## 2024-08-01 DIAGNOSIS — K76.0 HEPATIC STEATOSIS: ICD-10-CM

## 2024-08-01 PROCEDURE — 99207 PR NO CHARGE LOS: CPT | Performed by: PSYCHOLOGIST

## 2024-08-01 PROCEDURE — 99214 OFFICE O/P EST MOD 30 MIN: CPT | Performed by: PEDIATRICS

## 2024-08-01 PROCEDURE — 97803 MED NUTRITION INDIV SUBSEQ: CPT | Performed by: DIETITIAN, REGISTERED

## 2024-08-01 PROCEDURE — 96158 HLTH BHV IVNTJ INDIV 1ST 30: CPT | Mod: U7 | Performed by: PSYCHOLOGIST

## 2024-08-01 ASSESSMENT — PAIN SCALES - GENERAL: PAINLEVEL: NO PAIN (0)

## 2024-08-01 NOTE — PROGRESS NOTES
Medical Nutrition Therapy  Nutrition Reassessment  Patient seen in Pediatric Bariatric Clinic/Pediatric Weight Management Clinic.     Anthropometrics  Age:  19 year old male   Wt Readings from Last 5 Encounters:   08/01/24 111.4 kg (245 lb 9.5 oz)   06/06/24 123.8 kg (272 lb 14.9 oz) (>99%, Z= 2.72)*   05/23/24 127.6 kg (281 lb 4.8 oz) (>99%, Z= 2.84)*   05/14/24 133.3 kg (293 lb 14 oz) (>99%, Z= 2.99)*   05/09/24 134.6 kg (296 lb 12.8 oz) (>99%, Z= 3.03)*     * Growth percentiles are based on CDC (Boys, 2-20 Years) data.     Weight Loss 27 lbs since last clinic visit on 6/6/24.  Anthropometrics consistent with obesity.    Initial Appt Weight (8/10/23): 161.2 kg (355 lb 6.1 oz)  Weight on Surgery date (5/14/24): 293 lb 14 oz     Allergies/Intolerances:    Cat hair extract     Nutritional History  Patient seen in [facility] for bariatric post-op nutrition follow up. Patient is almost 3 months s/p laparoscopic sleeve gastrectomy. He has lost 48 lb since surgery (110 lb since initial appt). Patient states that he is struggling with abdominal discomfort when he is eating certain foods. He reports that he can feel it go down and progress to nausea and then pain. He reports that he is having more struggles with meals but if it is something light he is typically okay. The example given was a fried chicken sandwich from [employer] - ate 2 bites and had to lay down in the car. He is typically having 2 meals a day. Patient is feeling some sadness and regret with surgery due to change in his eating and side effects at this time.     Patient is taking his supplements sometimes - more than half the time. 2 MVI in AM and 2 calcium in PM. He is drinking mostly water and occasional caffeine drink.     Vitamin and Mineral Supplements & Medications:  Multivitamin/Mineral:  Yes  Calcium with Vitamin D:  Yes  Vitamin B12:  No  Current Outpatient Medications   Medication Sig Dispense Refill    acetaminophen (TYLENOL) 325 MG tablet  Take 2 tablets (650 mg) by mouth every 4 hours as needed for other (For optimal non-opioid multimodal pain management to improve pain control and physical function.)      Calcium-Vitamin D 600-5 MG-MCG TABS Take 1 tablet by mouth 2 times daily Hold for 1 month postop      clindamycin (CLEOCIN T) 1 % external solution Apply topically daily as needed (acne)      fexofenadine (ALLEGRA) 180 MG tablet Take 180 mg by mouth daily as needed for allergies PRN      hyoscyamine (LEVSIN/SL) 0.125 MG sublingual tablet Place 1 tablet (125 mcg) under the tongue every 4 hours as needed for cramping (spasms) 30 tablet 0    Melatonin 10 MG CAPS Take 10 mg by mouth nightly as needed (sleep)      multivitamin w/minerals (MULTI-VITAMIN) tablet Take 2 tablets by mouth 2 times daily Hold for 1 month postop      omeprazole (PRILOSEC) 20 MG DR capsule Take 1 capsule (20 mg) by mouth every morning (before breakfast) 30 capsule 0    senna-docusate (SENOKOT-S/PERICOLACE) 8.6-50 MG tablet Take 2 tablets by mouth 2 times daily as needed for constipation 60 tablet 0    ursodiol (ACTIGALL) 300 MG capsule Take 1 capsule (300 mg) by mouth 2 times daily 60 capsule 5        Previous Goals & Progress  Reduce BMI  -ongoing goal ; lost 27 lb   Transition to soft diet on 6/11 and follow for 2 weeks (6/11-6/25)- goal met  Transition to regular texture on 6/25 - goal met  Start taking calcium supplement - continue to take MVI - goal met   Continue to focus getting adequate protein into diet- ongoing goal     Nutrition Diagnosis  Obesity related to excessive energy intake as evidenced by BMI/age >95th %ile      Interventions & Education  Provided written and verbal education on the following:    Food record  Consume 3 or 4 meals a day  Eat slowly/Chew Foods Well  Avoid simple sugars/refined grains  Avoid high fat/fried foods  Eat Protein first    Reviewed previous nutrition goals and patient's progress since last appointment. Discussed the patient's  abdominal symptoms and possible cause - encouraged him to have smaller, more frequently meals/snacks and choosing lighter options. Also encouraged him to chew his food more completely before swallowing. Answered nutrition-related questions that pt had, and worked with him to set nutrition goals to work towards until next visit.    Goals  Reduce BMI   Food Record - track food intake and track abdominal symptoms   Work on chewing food completely   Work on eating 4-5 small meals/snacks a day instead of 2 meals   Consider drinking a protein shake to ensure eating adequate intake and protein       Monitoring/Evaluation  Will continue to monitor progress towards goals and provide education in Pediatric Weight Management.      Spent 30 minutes in consult with patient.     Tamica Wynne MS, RD, LD  Pager # [phone]

## 2024-08-01 NOTE — LETTER
8/1/2024      RE: Abel Flores  [address]  [place] [address]     Dear Colleague,    Thank you for the opportunity to participate in the care of your patient, Abel Flores, at the [facility] at [facility]. Please see a copy of my visit note below.    Medical Nutrition Therapy  Nutrition Reassessment  Patient seen in Pediatric Bariatric Clinic/Pediatric Weight Management Clinic.     Anthropometrics  Age:  19 year old male   Wt Readings from Last 5 Encounters:   08/01/24 111.4 kg (245 lb 9.5 oz)   06/06/24 123.8 kg (272 lb 14.9 oz) (>99%, Z= 2.72)*   05/23/24 127.6 kg (281 lb 4.8 oz) (>99%, Z= 2.84)*   05/14/24 133.3 kg (293 lb 14 oz) (>99%, Z= 2.99)*   05/09/24 134.6 kg (296 lb 12.8 oz) (>99%, Z= 3.03)*     * Growth percentiles are based on CDC (Boys, 2-20 Years) data.     Weight Loss 27 lbs since last clinic visit on 6/6/24.  Anthropometrics consistent with obesity.    Initial Appt Weight (8/10/23): 161.2 kg (355 lb 6.1 oz)  Weight on Surgery date (5/14/24): 293 lb 14 oz     Allergies/Intolerances:    Cat hair extract     Nutritional History  Patient seen in [facility] for bariatric post-op nutrition follow up. Patient is almost 3 months s/p laparoscopic sleeve gastrectomy. He has lost 48 lb since surgery (110 lb since initial appt). Patient states that he is struggling with abdominal discomfort when he is eating certain foods. He reports that he can feel it go down and progress to nausea and then pain. He reports that he is having more struggles with meals but if it is something light he is typically okay. The example given was a fried chicken sandwich from [employer] - ate 2 bites and had to lay down in the car. He is typically having 2 meals a day. Patient is feeling some sadness and regret with surgery due to change in his eating and side effects at this time.     Patient is taking his supplements  sometimes - more than half the time. 2 MVI in AM and 2 calcium in PM. He is drinking mostly water and occasional caffeine drink.     Vitamin and Mineral Supplements & Medications:  Multivitamin/Mineral:  Yes  Calcium with Vitamin D:  Yes  Vitamin B12:  No  Current Outpatient Medications   Medication Sig Dispense Refill     acetaminophen (TYLENOL) 325 MG tablet Take 2 tablets (650 mg) by mouth every 4 hours as needed for other (For optimal non-opioid multimodal pain management to improve pain control and physical function.)       Calcium-Vitamin D 600-5 MG-MCG TABS Take 1 tablet by mouth 2 times daily Hold for 1 month postop       clindamycin (CLEOCIN T) 1 % external solution Apply topically daily as needed (acne)       fexofenadine (ALLEGRA) 180 MG tablet Take 180 mg by mouth daily as needed for allergies PRN       hyoscyamine (LEVSIN/SL) 0.125 MG sublingual tablet Place 1 tablet (125 mcg) under the tongue every 4 hours as needed for cramping (spasms) 30 tablet 0     Melatonin 10 MG CAPS Take 10 mg by mouth nightly as needed (sleep)       multivitamin w/minerals (MULTI-VITAMIN) tablet Take 2 tablets by mouth 2 times daily Hold for 1 month postop       omeprazole (PRILOSEC) 20 MG DR capsule Take 1 capsule (20 mg) by mouth every morning (before breakfast) 30 capsule 0     senna-docusate (SENOKOT-S/PERICOLACE) 8.6-50 MG tablet Take 2 tablets by mouth 2 times daily as needed for constipation 60 tablet 0     ursodiol (ACTIGALL) 300 MG capsule Take 1 capsule (300 mg) by mouth 2 times daily 60 capsule 5        Previous Goals & Progress  Reduce BMI  -ongoing goal ; lost 27 lb   Transition to soft diet on 6/11 and follow for 2 weeks (6/11-6/25)- goal met  Transition to regular texture on 6/25 - goal met  Start taking calcium supplement - continue to take MVI - goal met   Continue to focus getting adequate protein into diet- ongoing goal     Nutrition Diagnosis  Obesity related to excessive energy intake as evidenced by  BMI/age >95th %ile      Interventions & Education  Provided written and verbal education on the following:    Food record  Consume 3 or 4 meals a day  Eat slowly/Chew Foods Well  Avoid simple sugars/refined grains  Avoid high fat/fried foods  Eat Protein first    Reviewed previous nutrition goals and patient's progress since last appointment. Discussed the patient's abdominal symptoms and possible cause - encouraged him to have smaller, more frequently meals/snacks and choosing lighter options. Also encouraged him to chew his food more completely before swallowing. Answered nutrition-related questions that pt had, and worked with him to set nutrition goals to work towards until next visit.    Goals  Reduce BMI   Food Record - track food intake and track abdominal symptoms   Work on chewing food completely   Work on eating 4-5 small meals/snacks a day instead of 2 meals   Consider drinking a protein shake to ensure eating adequate intake and protein       Monitoring/Evaluation  Will continue to monitor progress towards goals and provide education in Pediatric Weight Management.      Spent 30 minutes in consult with patient.     Tamica Wynne MS, DAMIEN, LD  Pager # [phone]        Please do not hesitate to contact me if you have any questions/concerns.     Sincerely,       Tamica Wynne RD

## 2024-08-01 NOTE — LETTER
2024      RE: Abel Flores  [address]  [place] [address]     Dear Colleague,    Thank you for the opportunity to participate in the care of your patient, Abel Flores, at the [facility] at [facility]. Please see a copy of my visit note below.        Date: 2024    PATIENT:  Abel Flores  :          2005  KARTHIK:          Aug 1, 2024    Dear Carrol Hitchcock:    I had the pleasure of seeing your patient, Abel Flores, for a visit in the [facility] on Aug 1, 2024 at the [facility]. Please see below for my assessment and plan of care.    As you may recall, Lenin is a 19 year old male with a BMI in the severe obesity range (defined as BMI >/ 35 kg/m2) complicated by type 2 diabetes, hepatic steatosis, and elevated BP.  He underwent laparoscopic sleeve gastrectomy on 2024 and presents today for a routine post-operative appointment.      Intercurrent History:  Had some mild abdominal discomfort following surgery, but this is better now.  He is noting some emotional changes surrounding the availability of food and fluids.  Realizing that he got emotional comfort from eating and now this is not an option.  Feels like he has other skills to manage emotions.    ROS:   Abdominal pain: with more dense foods, usually at meals vs lighter snacks   Nausea/vomiting: if eating too much    Constipation: none    Diarrhea: none    Dysphagia: can feel dense foods moving down his esophagus  Lightheadedness/dizziness: sometimes after a long day and standing up too quickly   Food intolerance: no specific   Shortness of breath: no   Chest pain: no  Heartburn: no      Medications:  - Off Mounjaro  - Taking MVI and calcium twice daily   - Not taking ursodiol as there were issues with getting it from the pharmacy      Activity:  - walks/light hiking - can go on longer walks now; not walking as much as he did at the beginning of the summer with work being busier    - working at [employer] - does not enjoy his job currently    Mental Health  Misses food and being able to drink large amounts water  He's excited to get back to college     Social History:   - Lenin finished first year [employer] - major in professional [employer] and emerging media     - Does not drink alcohol, does not smoke tobacco or vape; not smoking THC anymore    Current Medications:  Current Outpatient Rx   Medication Sig Dispense Refill    acetaminophen (TYLENOL) 325 MG tablet Take 2 tablets (650 mg) by mouth every 4 hours as needed for other (For optimal non-opioid multimodal pain management to improve pain control and physical function.)      Calcium-Vitamin D 600-5 MG-MCG TABS Take 1 tablet by mouth 2 times daily Hold for 1 month postop      clindamycin (CLEOCIN T) 1 % external solution Apply topically daily as needed (acne)      fexofenadine (ALLEGRA) 180 MG tablet Take 180 mg by mouth daily as needed for allergies PRN      hyoscyamine (LEVSIN/SL) 0.125 MG sublingual tablet Place 1 tablet (125 mcg) under the tongue every 4 hours as needed for cramping (spasms) 30 tablet 0    Melatonin 10 MG CAPS Take 10 mg by mouth nightly as needed (sleep)      multivitamin w/minerals (MULTI-VITAMIN) tablet Take 2 tablets by mouth 2 times daily Hold for 1 month postop      omeprazole (PRILOSEC) 20 MG DR capsule Take 1 capsule (20 mg) by mouth every morning (before breakfast) 30 capsule 0    senna-docusate (SENOKOT-S/PERICOLACE) 8.6-50 MG tablet Take 2 tablets by mouth 2 times daily as needed for constipation 60 tablet 0    ursodiol (ACTIGALL) 300 MG capsule Take 1 capsule (300 mg) by mouth 2 times daily 60 capsule 5       Physical Exam:  Physical Exam   Vitals:    B/P:   BP Readings from Last 1 Encounters:   08/01/24 120/70     P:   Pulse Readings from Last 1 Encounters:  "  08/01/24 66       Measured Weights:  Wt Readings from Last 4 Encounters:   08/01/24 111.4 kg (245 lb 9.5 oz)   06/06/24 123.8 kg (272 lb 14.9 oz) (>99%, Z= 2.72)*   05/23/24 127.6 kg (281 lb 4.8 oz) (>99%, Z= 2.84)*   05/14/24 133.3 kg (293 lb 14 oz) (>99%, Z= 2.99)*     * Growth percentiles are based on CDC (Boys, 2-20 Years) data.       Height:    Ht Readings from Last 4 Encounters:   08/01/24 1.872 m (6' 1.7\")   06/06/24 1.855 m (6' 1.03\") (89%, Z= 1.22)*   05/23/24 1.854 m (6' 1\") (89%, Z= 1.21)*   05/14/24 1.854 m (6' 1\") (89%, Z= 1.21)*     * Growth percentiles are based on CDC (Boys, 2-20 Years) data.     Body Mass Index:  Body mass index is 31.79 kg/m .    Labs:    No results found for any visits on 08/01/24.   Latest Reference Range & Units 02/08/24 13:27   Hemoglobin A1C <5.7 % 5.3     PreOpLabs:   Latest Reference Range & Units 05/06/24 09:15   Sodium 135 - 145 mmol/L 143   Potassium 3.4 - 5.3 mmol/L 4.4   Chloride 98 - 107 mmol/L 107   Carbon Dioxide (CO2) 22 - 29 mmol/L 25   Urea Nitrogen 6.0 - 20.0 mg/dL 23.4 (H)   Creatinine 0.67 - 1.17 mg/dL 0.89   GFR Estimate >60 mL/min/1.73m2 >90   Calcium 8.6 - 10.0 mg/dL 9.8   Anion Gap 7 - 15 mmol/L 11   Albumin 3.5 - 5.2 g/dL 4.5   Protein Total 6.4 - 8.3 g/dL 7.7   Alkaline Phosphatase 65 - 260 U/L 77   ALT 0 - 50 U/L 35   AST 0 - 35 U/L 30   Bilirubin Total <=1.2 mg/dL 0.7   Glucose 70 - 99 mg/dL 90   WBC 4.0 - 11.0 10e3/uL 13.1 (H)   Hemoglobin 13.3 - 17.7 g/dL 14.7   Hematocrit 40.0 - 53.0 % 45.0   Platelet Count 150 - 450 10e3/uL 324   RBC Count 4.40 - 5.90 10e6/uL 5.10   MCV 78 - 100 fL 88   MCH 26.5 - 33.0 pg 28.8   MCHC 31.5 - 36.5 g/dL 32.7   RDW 10.0 - 15.0 % 13.2   (H): Data is abnormally high     Assessment:  Abel \"Lenin\" is a 18 year old with class 3 obesity complicated by type 2 diabetes, hepatic steatosis, and elevated BP.  Lenin is now 2.5 mos s/p LSG (DOS 5/14/2024) and presents today for a routine post-op visit.  He is tolerating a soft " diet and is not having any side effects. Weight on the day of surgery was 293 lbs and weight today is 245 lbs, reflecting a 48 lb weight loss (17%). Overall weight loss has been 110 lbs. Discussed continuing routine post-operative cares. Recommended careful chewing of complex textures (ex: meat) to help with sensation of food moving through esophagus. Only 2.5 months post-op and these issues with food intolerance may continue to improve with time.         Abel montoya current problem list reviewed today includes:    Encounter Diagnoses   Name Primary?    Severe obesity (H) Yes    Hepatic steatosis     Type 2 diabetes mellitus without complication, without long-term current use of insulin (H)     S/P laparoscopic sleeve gastrectomy        Care Plan:  Severe Obesity: max BMI 45.64 and 355 pounds (8/10/23)  - PreOp: BMI 38.5 kg/m2; weight: 296 pounds  - Today: BMI 32 kg/m2; 245 pounds; 17 % weight loss  - RD and psychology appointments today as part of bariatric clinic   - Recommend ursodiol 300 mg bid for gallstone prophylaxis    - Continue routine post-operative cares, including daily supplements   - Routine post-operative labs - ordered today, Lenin unable to stay for labs (had to leave for a work shift), changed to future lab orders      Type 2 Diabetes: Hgb A1c 5.3%; antibody testing done at [facility] - negative    - In remission - recheck Hgb A1c with post-op labs      Hepatic Steatosis: GI consultation done; biopsy not done   - ALT normalized prior to surgery (35 U/L)  - Last US: 2/15/2023      Elevated BP  - Normalized    We are looking forward to seeing Lenin for a follow up in 3 months.     30 minutes spent by me on the date of the encounter doing chart review, patient visit, documentation, and discussion with other provider(s)     Thank you for including me in the care of your patient.  Please do not hesitate to call with questions or concerns.    Sincerely,    Ana Rosa Akins MD, MS   American Board of Obesity  Medicine Diplomate      Department of Pediatrics   [facility]       Copy to patient  Huong Flores Frank  [address]  [place] [address]

## 2024-08-01 NOTE — NURSING NOTE
"[facility] [[id number]]  Chief Complaint   Patient presents with    RECHECK     Follow-up       Initial /70   Pulse 66   Ht 6' 1.7\" (187.2 cm)   Wt 245 lb 9.5 oz (111.4 kg)   BMI 31.79 kg/m   Estimated body mass index is 31.79 kg/m  as calculated from the following:    Height as of this encounter: 6' 1.7\" (187.2 cm).    Weight as of this encounter: 245 lb 9.5 oz (111.4 kg).  Medication Reconciliation: complete    Does the patient need any medication refills today? No    Does the patient/parent need MyChart or Proxy acces today? No    Shalonda Alvarez                "

## 2024-08-01 NOTE — LETTER
"8/1/2024      RE: Able Flores  [address]  [place] [address]     Dear Colleague,    Thank you for the opportunity to participate in the care of your patient, Abel Flores, at the [facility] at [facility]. Please see a copy of my visit note below.    Pediatric Psychology Progress Note    Start time: 1:30pm  Stop time: 2:05pm  Service:  [id number] - Health behavior intervention, individual, initial 30 minutes     Diagnosis:   Encounter Diagnosis   Name Primary?     S/P laparoscopic sleeve gastrectomy Yes       Subjective: Abel \"Lenin\" REKHA Flores is a 19 year old male who was referred as a part of the metabolic and bariatric surgery program. Preferred pronouns are he/him.     Objective: Clinician met with Lenin individually. Session content was focused on checking in post-surgery and discussion of mood, body image, and eating habits. Lenin reported noticing a difference in his relationship with food. Specifically, he explained that whereas he previously enjoyed cooking and eating, he now felt like eating was a chore and made him feel nauseous, which felt disappointing. He mentioned noticing some body changes including increased lightness, improved cardiovascular health, and better stamina while hiking and running. He stated that he does not look in the mirror often but that others had commented on his weight change. Generally, Lenin reported low mood and not enjoying his summer job. He stated that he was excited to resume college in the fall and was looking forward to returning to that routine. He expressed reluctance in making changes to his eating habits or daily routine in the meantime, and preferred to take a passive approach. He stated that he was working for two more weeks then would be visiting his sister in [place]. Clinician recommended monitoring his mood and reaching out for mental health support if he did " not notice improvements in mood approximately one month after starting college.    Assessment: Lenin was appropriately dressed and well-groomed for the appointment. Attitude was cooperative and responses to questions were meaningful and clear. Mood was positive though overwhelmed. Affect was congruent, and when discussing being overwhelmed, Lenin teared up. Thought process was clear and logical.     Plan: Follow up with team in approximately 3 months. Lenin should contact the team or connect with campus counseling if he does not notice any improvements in mood approximately one month after starting college.     Nay Anthony, MSc, MA  Pre-doctoral Intern  Pediatric Psychology Program  Department of Pediatrics      Patrizia Carolina, PhD, LP, BCBA-D    of Pediatrics   Board Certified Behavior Analyst-Doctoral   Department of Pediatrics   *no letter     The author of this note documented a reason for not sharing it with the patient.    I was present for the therapy session with the patient and agree with the plan as documented.    Patrizia Carolina, Ph.D., L.P.  Department of Pediatrics  August 11, 2024        Please do not hesitate to contact me if you have any questions/concerns.     Sincerely,       Patrizia Carolina LP, PhD LP

## 2024-08-01 NOTE — PROGRESS NOTES
"Pediatric Psychology Progress Note    Start time: 1:30pm  Stop time: 2:05pm  Service:  [id number] - Health behavior intervention, individual, initial 30 minutes     Diagnosis:   Encounter Diagnosis   Name Primary?    S/P laparoscopic sleeve gastrectomy Yes       Subjective: Abel \"Lenin\" REKHA Flores is a 19 year old male who was referred as a part of the metabolic and bariatric surgery program. Preferred pronouns are he/him.     Objective: Clinician met with Lenin individually. Session content was focused on checking in post-surgery and discussion of mood, body image, and eating habits. Lenin reported noticing a difference in his relationship with food. Specifically, he explained that whereas he previously enjoyed cooking and eating, he now felt like eating was a chore and made him feel nauseous, which felt disappointing. He mentioned noticing some body changes including increased lightness, improved cardiovascular health, and better stamina while hiking and running. He stated that he does not look in the mirror often but that others had commented on his weight change. Generally, Lenin reported low mood and not enjoying his summer job. He stated that he was excited to resume college in the fall and was looking forward to returning to that routine. He expressed reluctance in making changes to his eating habits or daily routine in the meantime, and preferred to take a passive approach. He stated that he was working for two more weeks then would be visiting his sister in [place]. Clinician recommended monitoring his mood and reaching out for mental health support if he did not notice improvements in mood approximately one month after starting college.    Assessment: Lenin was appropriately dressed and well-groomed for the appointment. Attitude was cooperative and responses to questions were meaningful and clear. Mood was positive though overwhelmed. Affect was congruent, and when discussing being overwhelmed, Lenin teared up. " Thought process was clear and logical.     Plan: Follow up with team in approximately 3 months. Lenin should contact the team or connect with campus counseling if he does not notice any improvements in mood approximately one month after starting college.     Nay Anthony, MSc, MA  Pre-doctoral Intern  Pediatric Psychology Program  Department of Pediatrics      Patrizia Carolina, PhD, LP, BCBA-D    of Pediatrics   Board Certified Behavior Analyst-Doctoral   Department of Pediatrics   *no letter     The author of this note documented a reason for not sharing it with the patient.    I was present for the therapy session with the patient and agree with the plan as documented.    Patrizia Carolina, Ph.D., L.P.  Department of Pediatrics  August 11, 2024

## 2024-08-07 ENCOUNTER — TELEPHONE (OUTPATIENT)
Dept: PEDIATRICS | Facility: CLINIC | Age: 19
End: 2024-08-07
Payer: COMMERCIAL

## 2024-08-07 NOTE — TELEPHONE ENCOUNTER
Called Lenin and left message re: Calling to discuss blood draw.  Asked for a call back.  Left direct call back number.

## 2024-08-09 NOTE — TELEPHONE ENCOUNTER
Called and spoke to Lenin.  Discussed labs.  He will get labs done at [facility].  Number of clinic given.  Lenin had no other questions.

## 2024-10-15 ENCOUNTER — TELEPHONE (OUTPATIENT)
Dept: PEDIATRICS | Facility: CLINIC | Age: 19
End: 2024-10-15
Payer: COMMERCIAL

## 2024-10-15 NOTE — TELEPHONE ENCOUNTER
Called Lenin and left message re: Calling to schedule 6 month post op appointment due in November.  Left direct call back number to schedule.

## 2025-04-27 ENCOUNTER — HEALTH MAINTENANCE LETTER (OUTPATIENT)
Age: 20
End: 2025-04-27

## 2025-06-03 ENCOUNTER — TELEPHONE (OUTPATIENT)
Dept: PEDIATRICS | Facility: CLINIC | Age: 20
End: 2025-06-03
Payer: COMMERCIAL

## 2025-06-03 NOTE — PROGRESS NOTES
"Medical Nutrition Therapy  Nutrition Reassessment  Patient seen in Pediatric Bariatric Clinic/Pediatric Weight Management Clinic,.     Anthropometrics  Age:  19 year old male   Height:  187.8 cm (6' 1.94\")  Weight:  95.6 kg (210 lb 12.2 oz)  BMI:  27.11  Weight Loss 35 lbs since last clinic visit on 8/1/24.  Anthropometrics consistent with obesity.    Initial Appt Weight (8/10/23): 161.2 kg (355 lb 6.1 oz)  Weight on Surgery date (5/14/24): 293 lb 14 oz     Allergies/Intolerances:    Cat dander     Nutritional History  Patient seen in [facility] for bariatric post-op nutrition follow up. Patient is about 1 year s/p laparoscopic sleeve gastrectomy. Patient has lost about 35 lbs in the past 10 months. Patient reports that he is doing well physically but admits that he is just starting his mental health journey. He does want to improve his strength.     Patient states that overall he is not feeling hungry. He is in a new normal for eating. Getting full quickly and staying full for some time after. Not having any struggles with food intolerances. He states that he is not really eating for pleasure. Drinking water has been hard. Wanting to drink at meals.     Patient states that he is taking his MVI 3-4 x/week. He is also taking calcium, B12 and Vitamin D. Was having tingling in his legs so he started B12 and it has stopped.     Nutritional Intakes  Sample intake includes:  Breakfast:   baked good or with protein side + fruit ; 1/2 portion at [employer]   Am Snack:     Lunch:   sandwich + water or chips(over time)   PM Snack:     occasoally - salami with cheese with cracker or fruit different   Dinner:   @ [employer]; home does more cooking - brussel sprouts, spinach potatoes, meat, stir adam ; burger (1) with apple    HS Snack:  none reported    Beverages:  water, alcoholic 1x every 3-4 weeks        Activity  Walking and hiking trail back packng   Got a bike recently -   Wanting to increase high tensity -   Gym " membership - wants to increase weight strength training     Vitamin and Mineral Supplements & Medications:  Multivitamin/Mineral:  Yes  Vitamin D:  Yes  Vitamin B12:  Yes  Current Outpatient Medications   Medication Sig Dispense Refill    acetaminophen (TYLENOL) 325 MG tablet Take 2 tablets (650 mg) by mouth every 4 hours as needed for other (For optimal non-opioid multimodal pain management to improve pain control and physical function.)      Calcium-Vitamin D 600-5 MG-MCG TABS Take 1 tablet by mouth 2 times daily Hold for 1 month postop      clindamycin (CLEOCIN T) 1 % external solution Apply topically daily as needed (acne)      fexofenadine (ALLEGRA) 180 MG tablet Take 180 mg by mouth daily as needed for allergies PRN      hyoscyamine (LEVSIN/SL) 0.125 MG sublingual tablet Place 1 tablet (125 mcg) under the tongue every 4 hours as needed for cramping (spasms) 30 tablet 0    Melatonin 10 MG CAPS Take 10 mg by mouth nightly as needed (sleep)      multivitamin w/minerals (MULTI-VITAMIN) tablet Take 2 tablets by mouth 2 times daily Hold for 1 month postop      omeprazole (PRILOSEC) 20 MG DR capsule Take 1 capsule (20 mg) by mouth every morning (before breakfast) 30 capsule 0    senna-docusate (SENOKOT-S/PERICOLACE) 8.6-50 MG tablet Take 2 tablets by mouth 2 times daily as needed for constipation 60 tablet 0    ursodiol (ACTIGALL) 300 MG capsule Take 1 capsule (300 mg) by mouth 2 times daily 60 capsule 5          Previous Goals & Progress  Reduce BMI   Food Record - track food intake and track abdominal symptoms   Work on chewing food completely   Work on eating 4-5 small meals/snacks a day instead of 2 meals   Consider drinking a protein shake to ensure eating adequate intake and protein     Nutrition Diagnosis  Overweight related to energy imbalance as evidenced by BMI of 27.       Interventions & Education  Provided written and verbal education on the following:    Consume 3 or 4 meals a day  64 oz Fluid/day  Sip  fluids/avoid straws/ separate from meals  Post-Op Diet Plan      Goals  Reduce BMI   Continue to focus on protein at meals   Use protein supplements if needed  Continue with physical activity   Start to incorporate strength training   Take MVI more consistently - daily       Monitoring/Evaluation  Will continue to monitor progress towards goals and provide education in Pediatric Weight Management.      Spent 30 minutes in consult with patient.     Tamica Wynne MS, RD, LD  Pager # [phone]

## 2025-06-03 NOTE — PROGRESS NOTES
Date: 2025    PATIENT:  Abel Flores  :          2005  KARTHIK:          2025    Dear Carrol Hitchcock:    I had the pleasure of seeing your patient, Abel Flores, for a visit in the [facility] on 2025 at the [facility]. Please see below for my assessment and plan of care.    As you may recall, Lenin is a 19 year old male with a BMI in the severe obesity range (defined as BMI >/ 35 kg/m2) complicated by type 2 diabetes, hepatic steatosis, and elevated BP.  He underwent laparoscopic sleeve gastrectomy on 2024 and presents today for a routine post-operative appointment.      Intercurrent History:   Lenin felt as though weight loss was relatively simple after the surgery.  He also now feels as though his weight loss has plateaued and if he wanted to lose more weight he would have to put more effort forward compared to after the surgery initially.  Lenin has noticed he feels hungrier and is able to eat more lately.  Lenin says home measures have varied from 203 to 210 for the last 3 months.    ROS:   Fevers: none   Abdominal pain: occasionally feelings of fullness with overeating (~3x/week), no pain otherwise   Nausea/vomiting: none   Constipation: none   Diarrhea: none   Dysphagia: none  Lightheadedness/dizziness: when standing if forgetting to take vitamins once every 2 weeks, per Lenin feels based on what foods Lenin has eaten that day  Food intolerance: none   Shortness of breath: none   Chest pain: none  Heartburn: none       Medications:  - melatonin  - vitamins + calcium     Activity:  - used to powerlift prior to the surgery, focuses more on hiking, walking, backpacking now daily 4-5 miles  - interested in doing more weight lifting to regain muscle mass lost after surgery  - feels like he can run much faster and further than he ever used to but does endorse feeling weaker now  "    Mental Health  - College has been stressful  - Sees a therapist in [place], [place] biweekly for 6 weeks thus far, feels it is helpful  - feels as though food used to have emotionally beneficial effect when he could eat more     Social History:   - Lenin finished first year -[employer] - major in professional communication and 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    Current Medications:  Current Outpatient Rx   Medication Sig Dispense Refill    acetaminophen (TYLENOL) 325 MG tablet Take 2 tablets (650 mg) by mouth every 4 hours as needed for other (For optimal non-opioid multimodal pain management to improve pain control and physical function.)      Calcium-Vitamin D 600-5 MG-MCG TABS Take 1 tablet by mouth 2 times daily Hold for 1 month postop      Melatonin 10 MG CAPS Take 10 mg by mouth nightly as needed (sleep)      multivitamin w/minerals (MULTI-VITAMIN) tablet Take 2 tablets by mouth 2 times daily Hold for 1 month postop         Physical Exam:    Vitals:    B/P:   BP Readings from Last 1 Encounters:   06/05/25 118/78     P:   Pulse Readings from Last 1 Encounters:   06/05/25 67       Measured Weights:  Wt Readings from Last 4 Encounters:   06/05/25 95.6 kg (210 lb 12.2 oz)   08/01/24 111.4 kg (245 lb 9.5 oz)   06/06/24 123.8 kg (272 lb 14.9 oz) (>99%, Z= 2.72)*   05/23/24 127.6 kg (281 lb 4.8 oz) (>99%, Z= 2.84)*     * Growth percentiles are based on CDC (Boys, 2-20 Years) data.       Height:    Ht Readings from Last 4 Encounters:   06/05/25 1.878 m (6' 1.94\")   08/01/24 1.872 m (6' 1.7\")   06/06/24 1.855 m (6' 1.03\") (89%, Z= 1.22)*   05/23/24 1.854 m (6' 1\") (89%, Z= 1.21)*     * Growth percentiles are based on CDC (Boys, 2-20 Years) data.     Body Mass Index:  Body mass index is 27.11 kg/m .    Labs:    No results found for any visits on 06/05/25.  Results for orders placed or performed in " visit on 06/05/25   Zinc     Status: None   Result Value Ref Range    Zinc, Serum/Plasma 75.4 60.0 - 120.0 ug/dL   Iron & Iron Binding Capacity     Status: Abnormal   Result Value Ref Range    Iron 54 (L) 61 - 157 ug/dL    Iron Binding Capacity 358 240 - 430 ug/dL    Iron Sat Index 15 15 - 46 %   Ferritin     Status: Normal   Result Value Ref Range    Ferritin 45 31 - 409 ng/mL   Vitamin D Deficiency     Status: Normal   Result Value Ref Range    Vitamin D, Total (25-Hydroxy) 26 20 - 50 ng/mL    Narrative    Season, race, dietary intake, and treatment affect the concentration of 25-hydroxy-Vitamin D. Values may decrease during winter months and increase during summer months.    Vitamin D determination is routinely performed by an immunoassay specific for 25 hydroxyvitamin D3.  If an individual is on vitamin D2(ergocalciferol) supplementation, please specify 25 OH vitamin D2 and D3 level determination by LCMSMS test VITD23.     Folate     Status: Normal   Result Value Ref Range    Folic Acid 9.4 4.6 - 34.8 ng/mL   Vitamin B12     Status: Normal   Result Value Ref Range    Vitamin B12 467 232 - 1,245 pg/mL   Parathyroid Hormone Intact     Status: Normal   Result Value Ref Range    Parathyroid Hormone Intact 32 15 - 65 pg/mL    Narrative    This result was obtained with the Roche Elecsys PTH STAT assay.   This reference range differs from PTH assays used in other [facility] laboratories.   Magnesium     Status: Normal   Result Value Ref Range    Magnesium 2.1 1.7 - 2.3 mg/dL   Comprehensive metabolic panel     Status: None   Result Value Ref Range    Sodium 141 135 - 145 mmol/L    Potassium 4.0 3.4 - 5.3 mmol/L    Carbon Dioxide (CO2) 28 22 - 29 mmol/L    Anion Gap 10 7 - 15 mmol/L    Urea Nitrogen 15.7 6.0 - 20.0 mg/dL    Creatinine 0.74 0.67 - 1.17 mg/dL    GFR Estimate >90 >60 mL/min/1.73m2    Calcium 9.5 8.8 - 10.4 mg/dL    Chloride 103 98 - 107 mmol/L    Glucose 93 70 - 99 mg/dL    Alkaline Phosphatase 73  40 - 150 U/L    AST 15 0 - 45 U/L    ALT 9 0 - 70 U/L    Protein Total 7.6 6.4 - 8.3 g/dL    Albumin 4.6 3.5 - 5.2 g/dL    Bilirubin Total 0.8 <=1.2 mg/dL    Patient Fasting > 8hrs? No    Results for orders placed or performed in visit on 06/05/25   Phosphorus     Status: Normal   Result Value Ref Range    Phosphorus 3.4 2.5 - 4.5 mg/dL   Lipid Profile     Status: None   Result Value Ref Range    Cholesterol 133 <200 mg/dL    Triglycerides 55 <150 mg/dL    Direct Measure HDL 58 >=40 mg/dL    LDL Cholesterol Calculated 64 <100 mg/dL    Non HDL Cholesterol 75 <130 mg/dL    Patient Fasting > 8hrs? No     Narrative    Cholesterol  Desirable: < 200 mg/dL  Borderline High: 200 - 239 mg/dL  High: >= 240 mg/dL    Triglycerides  Normal: < 150 mg/dL  Borderline High: 150 - 199 mg/dL  High: 200-499 mg/dL  Very High: >= 500 mg/dL    Direct Measure HDL  Female: >= 50 mg/dL   Male: >= 40 mg/dL    LDL Cholesterol  Desirable: < 100 mg/dL  Above Desirable: 100 - 129 mg/dL   Borderline High: 130 - 159 mg/dL   High:  160 - 189 mg/dL   Very High: >= 190 mg/dL    Non HDL Cholesterol  Desirable: < 130 mg/dL  Above Desirable: 130 - 159 mg/dL  Borderline High: 160 - 189 mg/dL  High: 190 - 219 mg/dL  Very High: >= 220 mg/dL   Hemoglobin A1c     Status: Normal   Result Value Ref Range    Estimated Average Glucose 111 <117 mg/dL    Hemoglobin A1C 5.5 <5.7 %   CBC with platelets and differential     Status: None   Result Value Ref Range    WBC Count 8.5 4.0 - 11.0 10e3/uL    RBC Count 4.80 4.40 - 5.90 10e6/uL    Hemoglobin 14.3 13.3 - 17.7 g/dL    Hematocrit 43.8 40.0 - 53.0 %    MCV 91 78 - 100 fL    MCH 29.8 26.5 - 33.0 pg    MCHC 32.6 31.5 - 36.5 g/dL    RDW 13.2 10.0 - 15.0 %    Platelet Count 296 150 - 450 10e3/uL    % Neutrophils 66 %    % Lymphocytes 24 %    % Monocytes 8 %    % Eosinophils 1 %    % Basophils 0 %    % Immature Granulocytes 0 %    NRBCs per 100 WBC 0 <1 /100    Absolute Neutrophils 5.6 1.6 - 8.3 10e3/uL    Absolute  "Lymphocytes 2.1 0.8 - 5.3 10e3/uL    Absolute Monocytes 0.7 0.0 - 1.3 10e3/uL    Absolute Eosinophils 0.1 0.0 - 0.7 10e3/uL    Absolute Basophils 0.0 0.0 - 0.2 10e3/uL    Absolute Immature Granulocytes 0.0 <=0.4 10e3/uL    Absolute NRBCs 0.0 10e3/uL   CBC with Platelets & Differential     Status: None    Narrative    The following orders were created for panel order CBC with Platelets & Differential.  Procedure                               Abnormality         Status                     ---------                               -----------         ------                     CBC with platelets and ...[[id number]]                      Final result                 Please view results for these tests on the individual orders.          Assessment:  Abel \"Lenin\" is a 18 year old with class 3 obesity complicated by type 2 diabetes, hepatic steatosis, and elevated BP.  Lenin is now s/p LSG done 5/14/2024 and presents today for a routine post-op visit. Today, BMI is decreased to 27.11 kg/m2 from 31.79 kg/m2 ten months prior.  Additionally, Lenin feels as though his general weight loss trend has plateaued but does not feel as though it is difficult to maintain on lifestyle monotherapy at this time.  Today, we opted to recheck nutritional deficiencies and discussed future cares could take place at an adult bariatric clinic to which Lenin was agreeable.     Abel s current problem list reviewed today includes:    Encounter Diagnoses   Name Primary?    Status post bariatric surgery     Class 3 obesity (H) Yes       Care Plan:  Severe Obesity: BMI today 27.11, down from peak 37.11    - RD and psychology appointments today as part of bariatric clinic   - Continue routine post-operative cares, including daily supplements   - Routine post-operative labs drawn today     Type 2 Diabetes: Hgb A1c 5.3%; [facility]    - In remission - recheck Hgb A1c with post-op labs      Hepatic Steatosis: GI " consultation done; biopsy not done   - ALT normalized prior to surgery (35 U/L)  - Last US: 2/15/2023      Elevated BP  - Normalized    We have enjoyed having Lenin in our clinic thus far, and recommend Lenin continue cares in the adult bariatric clinics.    Thank you for including me in the care of your patient.  Please do not hesitate to call with questions or concerns.    Sincerely,    eY Wheatley DO, MS  Pediatric Weight Management  Department of Pediatrics  [facility]      Physician Attestation   I, Ana Rosa Akins MD, saw this patient and agree with the findings and plan of care as documented in the note.      Items personally reviewed/procedural attestation: vitals, labs, and history.    In speaking with Lenin, he reports that he has been taking his multi-vitamins 3-4x/week. He explains that he has to take them with food otherwise he'll feel sick. Because of that, he would miss taking them if he forgot to bring them with him to the cafeteria. He feels that this will be better now that he's living in an apartment for the summer.     Overall, Lenin is proud of the physical progress that he has made but identifies that he is now focused on his mental health journey. He is tearful in describing addressing his mental health but is seeking care.     Weight on day of surgery was 293 lbs and weight today is 210 lbs. This reflects a 83 lb (28.3%) weight loss since surgery. Labs obtained today are, overall, good. Iron level is low but hemoglobin is within normal limits. ALT and hemoglobin A1c remain normal. Recommend more consistent dosing with multi-vitamin with iron. Also discussed transition to adult care and Lenin is open to this. There are options in [place] that should be easier for him in terms of location. I am more than happy to see him back in this clinic whenever needed.     Review of the result(s) of each unique test - labs noted above  Ordering of each unique test  Management of stable chronic health  condition - obesity s/p LSG     Ana Rosa Akins MD, MS   American Board of Obesity Medicine Diplomate      Department of Pediatrics   [facility]             CC  Copy to patient  Huong Flores Frank  [address]  [place] [address]   Griseofulvin Counseling:  I discussed with the patient the risks of griseofulvin including but not limited to photosensitivity, cytopenia, liver damage, nausea/vomiting and severe allergy.  The patient understands that this medication is best absorbed when taken with a fatty meal (e.g., ice cream or french fries).

## 2025-06-05 ENCOUNTER — OFFICE VISIT (OUTPATIENT)
Dept: PEDIATRICS | Facility: CLINIC | Age: 20
End: 2025-06-05
Attending: PEDIATRICS
Payer: COMMERCIAL

## 2025-06-05 VITALS
SYSTOLIC BLOOD PRESSURE: 118 MMHG | WEIGHT: 210.76 LBS | HEIGHT: 74 IN | HEART RATE: 67 BPM | DIASTOLIC BLOOD PRESSURE: 78 MMHG | BODY MASS INDEX: 27.05 KG/M2

## 2025-06-05 DIAGNOSIS — E66.813 CLASS 3 OBESITY (H): Primary | ICD-10-CM

## 2025-06-05 DIAGNOSIS — Z98.84 STATUS POST BARIATRIC SURGERY: ICD-10-CM

## 2025-06-05 DIAGNOSIS — E66.813 CLASS 3 OBESITY (H): ICD-10-CM

## 2025-06-05 LAB
ALBUMIN SERPL BCG-MCNC: 4.6 G/DL (ref 3.5–5.2)
ALP SERPL-CCNC: 73 U/L (ref 40–150)
ALT SERPL W P-5'-P-CCNC: 9 U/L (ref 0–70)
ANION GAP SERPL CALCULATED.3IONS-SCNC: 10 MMOL/L (ref 7–15)
AST SERPL W P-5'-P-CCNC: 15 U/L (ref 0–45)
BILIRUB SERPL-MCNC: 0.8 MG/DL
BUN SERPL-MCNC: 15.7 MG/DL (ref 6–20)
CALCIUM SERPL-MCNC: 9.5 MG/DL (ref 8.8–10.4)
CHLORIDE SERPL-SCNC: 103 MMOL/L (ref 98–107)
CHOLEST SERPL-MCNC: 133 MG/DL
CREAT SERPL-MCNC: 0.74 MG/DL (ref 0.67–1.17)
EGFRCR SERPLBLD CKD-EPI 2021: >90 ML/MIN/1.73M2
EST. AVERAGE GLUCOSE BLD GHB EST-MCNC: 111 MG/DL
FASTING STATUS PATIENT QL REPORTED: NO
FASTING STATUS PATIENT QL REPORTED: NO
FERRITIN SERPL-MCNC: 45 NG/ML (ref 31–409)
FOLATE SERPL-MCNC: 9.4 NG/ML (ref 4.6–34.8)
GLUCOSE SERPL-MCNC: 93 MG/DL (ref 70–99)
HBA1C MFR BLD: 5.5 %
HCO3 SERPL-SCNC: 28 MMOL/L (ref 22–29)
HDLC SERPL-MCNC: 58 MG/DL
IRON BINDING CAPACITY (ROCHE): 358 UG/DL (ref 240–430)
IRON SATN MFR SERPL: 15 % (ref 15–46)
IRON SERPL-MCNC: 54 UG/DL (ref 61–157)
LDLC SERPL CALC-MCNC: 64 MG/DL
MAGNESIUM SERPL-MCNC: 2.1 MG/DL (ref 1.7–2.3)
NONHDLC SERPL-MCNC: 75 MG/DL
PHOSPHATE SERPL-MCNC: 3.4 MG/DL (ref 2.5–4.5)
POTASSIUM SERPL-SCNC: 4 MMOL/L (ref 3.4–5.3)
PROT SERPL-MCNC: 7.6 G/DL (ref 6.4–8.3)
PTH-INTACT SERPL-MCNC: 32 PG/ML (ref 15–65)
SODIUM SERPL-SCNC: 141 MMOL/L (ref 135–145)
TRIGL SERPL-MCNC: 55 MG/DL
VIT B12 SERPL-MCNC: 467 PG/ML (ref 232–1245)
VIT D+METAB SERPL-MCNC: 26 NG/ML (ref 20–50)

## 2025-06-05 PROCEDURE — 3048F LDL-C <100 MG/DL: CPT | Performed by: PEDIATRICS

## 2025-06-05 PROCEDURE — 84207 ASSAY OF VITAMIN B-6: CPT | Performed by: PEDIATRICS

## 2025-06-05 PROCEDURE — 3044F HG A1C LEVEL LT 7.0%: CPT | Performed by: PEDIATRICS

## 2025-06-05 PROCEDURE — 99215 OFFICE O/P EST HI 40 MIN: CPT | Mod: 25 | Performed by: PEDIATRICS

## 2025-06-05 PROCEDURE — 84630 ASSAY OF ZINC: CPT | Performed by: PSYCHOLOGIST

## 2025-06-05 PROCEDURE — 97803 MED NUTRITION INDIV SUBSEQ: CPT | Performed by: DIETITIAN, REGISTERED

## 2025-06-05 PROCEDURE — 82746 ASSAY OF FOLIC ACID SERUM: CPT | Performed by: PSYCHOLOGIST

## 2025-06-05 PROCEDURE — 82728 ASSAY OF FERRITIN: CPT | Performed by: PSYCHOLOGIST

## 2025-06-05 PROCEDURE — 84590 ASSAY OF VITAMIN A: CPT | Performed by: PSYCHOLOGIST

## 2025-06-05 PROCEDURE — 80061 LIPID PANEL: CPT | Performed by: PEDIATRICS

## 2025-06-05 PROCEDURE — 83540 ASSAY OF IRON: CPT | Performed by: PSYCHOLOGIST

## 2025-06-05 PROCEDURE — 85025 COMPLETE CBC W/AUTO DIFF WBC: CPT | Performed by: PEDIATRICS

## 2025-06-05 PROCEDURE — 3078F DIAST BP <80 MM HG: CPT | Performed by: PEDIATRICS

## 2025-06-05 PROCEDURE — 3074F SYST BP LT 130 MM HG: CPT | Performed by: PEDIATRICS

## 2025-06-05 PROCEDURE — 84425 ASSAY OF VITAMIN B-1: CPT | Performed by: PEDIATRICS

## 2025-06-05 PROCEDURE — 82607 VITAMIN B-12: CPT | Performed by: PSYCHOLOGIST

## 2025-06-05 PROCEDURE — 83970 ASSAY OF PARATHORMONE: CPT | Performed by: PSYCHOLOGIST

## 2025-06-05 PROCEDURE — 99213 OFFICE O/P EST LOW 20 MIN: CPT | Mod: GC | Performed by: PEDIATRICS

## 2025-06-05 PROCEDURE — 83036 HEMOGLOBIN GLYCOSYLATED A1C: CPT | Performed by: PEDIATRICS

## 2025-06-05 PROCEDURE — 36415 COLL VENOUS BLD VENIPUNCTURE: CPT | Performed by: PSYCHOLOGIST

## 2025-06-05 PROCEDURE — 84100 ASSAY OF PHOSPHORUS: CPT | Performed by: PEDIATRICS

## 2025-06-05 PROCEDURE — 82247 BILIRUBIN TOTAL: CPT | Performed by: PSYCHOLOGIST

## 2025-06-05 PROCEDURE — 82306 VITAMIN D 25 HYDROXY: CPT | Performed by: PSYCHOLOGIST

## 2025-06-05 PROCEDURE — 83735 ASSAY OF MAGNESIUM: CPT | Performed by: PSYCHOLOGIST

## 2025-06-05 NOTE — LETTER
"6/5/2025      RE: Abel Flores  [address]  [place] [address]     Dear Colleague,    Thank you for the opportunity to participate in the care of your patient, Abel Flores, at the [facility] at [facility]. Please see a copy of my visit note below.    Medical Nutrition Therapy  Nutrition Reassessment  Patient seen in Pediatric Bariatric Clinic/Pediatric Weight Management Clinic,.     Anthropometrics  Age:  19 year old male   Height:  187.8 cm (6' 1.94\")  Weight:  95.6 kg (210 lb 12.2 oz)  BMI:  27.11  Weight Loss 35 lbs since last clinic visit on 8/1/24.  Anthropometrics consistent with obesity.    Initial Appt Weight (8/10/23): 161.2 kg (355 lb 6.1 oz)  Weight on Surgery date (5/14/24): 293 lb 14 oz     Allergies/Intolerances:    Cat dander     Nutritional History  Patient seen in [facility] for bariatric post-op nutrition follow up. Patient is about 1 year s/p laparoscopic sleeve gastrectomy. Patient has lost about 35 lbs in the past 10 months. Patient reports that he is doing well physically but admits that he is just starting his mental health journey. He does want to improve his strength.     Patient states that overall he is not feeling hungry. He is in a new normal for eating. Getting full quickly and staying full for some time after. Not having any struggles with food intolerances. He states that he is not really eating for pleasure. Drinking water has been hard. Wanting to drink at meals.     Patient states that he is taking his MVI 3-4 x/week. He is also taking calcium, B12 and Vitamin D. Was having tingling in his legs so he started B12 and it has stopped.     Nutritional Intakes  Sample intake includes:  Breakfast:   baked good or with protein side + fruit ; 1/2 portion at [employer]   Am Snack:     Lunch:   sandwich + water or chips(over time)   PM Snack:     occasoally - salami with cheese with " cracker or fruit different   Dinner:   @ [employer]; home does more cooking - brussel sprouts, spinach potatoes, meat, stir adam ; burger (1) with apple    HS Snack:  none reported    Beverages:  water, alcoholic 1x every 3-4 weeks        Activity  Walking and hiking trail back packng   Got a bike recently -   Wanting to increase high tensity -   Gym membership - wants to increase weight strength training     Vitamin and Mineral Supplements & Medications:  Multivitamin/Mineral:  Yes  Vitamin D:  Yes  Vitamin B12:  Yes  Current Outpatient Medications   Medication Sig Dispense Refill     acetaminophen (TYLENOL) 325 MG tablet Take 2 tablets (650 mg) by mouth every 4 hours as needed for other (For optimal non-opioid multimodal pain management to improve pain control and physical function.)       Calcium-Vitamin D 600-5 MG-MCG TABS Take 1 tablet by mouth 2 times daily Hold for 1 month postop       clindamycin (CLEOCIN T) 1 % external solution Apply topically daily as needed (acne)       fexofenadine (ALLEGRA) 180 MG tablet Take 180 mg by mouth daily as needed for allergies PRN       hyoscyamine (LEVSIN/SL) 0.125 MG sublingual tablet Place 1 tablet (125 mcg) under the tongue every 4 hours as needed for cramping (spasms) 30 tablet 0     Melatonin 10 MG CAPS Take 10 mg by mouth nightly as needed (sleep)       multivitamin w/minerals (MULTI-VITAMIN) tablet Take 2 tablets by mouth 2 times daily Hold for 1 month postop       omeprazole (PRILOSEC) 20 MG DR capsule Take 1 capsule (20 mg) by mouth every morning (before breakfast) 30 capsule 0     senna-docusate (SENOKOT-S/PERICOLACE) 8.6-50 MG tablet Take 2 tablets by mouth 2 times daily as needed for constipation 60 tablet 0     ursodiol (ACTIGALL) 300 MG capsule Take 1 capsule (300 mg) by mouth 2 times daily 60 capsule 5          Previous Goals & Progress  Reduce BMI   Food Record - track food intake and track abdominal symptoms   Work on chewing food completely   Work on  eating 4-5 small meals/snacks a day instead of 2 meals   Consider drinking a protein shake to ensure eating adequate intake and protein     Nutrition Diagnosis  Overweight related to energy imbalance as evidenced by BMI of 27.       Interventions & Education  Provided written and verbal education on the following:    Consume 3 or 4 meals a day  64 oz Fluid/day  Sip fluids/avoid straws/ separate from meals  Post-Op Diet Plan      Goals  Reduce BMI   Continue to focus on protein at meals   Use protein supplements if needed  Continue with physical activity   Start to incorporate strength training   Take MVI more consistently - daily       Monitoring/Evaluation  Will continue to monitor progress towards goals and provide education in Pediatric Weight Management.      Spent 30 minutes in consult with patient.     Tamica Wynne MS, DAMIEN, LD  Pager # [phone]        Please do not hesitate to contact me if you have any questions/concerns.     Sincerely,       Tamica Wynne RD

## 2025-06-05 NOTE — PROGRESS NOTES
"Pediatric Psychology Progress Note    Start time: 3:15pm  Stop time: 4:07pm  Service:  [id number] - Health behavior intervention, individual, initial 30 minutes   [id number] - Health behavior intervention, individual, each additional 15 minutes     Diagnosis:   Encounter Diagnoses   Name Primary?    Status post bariatric surgery     Class 3 obesity (H)        Subjective: Abel \"Lenin\" REKHA Flores is a 19 year old male who was referred as a part of the metabolic and bariatric surgery program. His pronouns are he/him.     Objective: The team met with Lenin independently. Session content was focused on checking in post-surgery and discussion of mental health and future plans. Lenin recently completed a year of college and will be working over the summer. He reported that he disliked completing his general education coursework (especially math). Lenin reported feeling as if he is not in a good place in terms of mental wellbeing. He described himself as being in a valley (I.e., a really low spot), while he can see the peak (what good mental health looks like) from where he is standing. He reported anhedonia, low motivation, feeling down/depressed, and questioning his identity existentially. He is currently seeing a therapist who is working on processing his thoughts/emotions and challenging the negative narrative he tells himself. Lenin reported loneliness, social isolation, feeling disconnected from others, and overall difficulty with social interaction. He also reported feeling hopeless/helpless about the state of the world. Other worries include his recent move into an apartment with his friends and the potential conflict that may arise. He engaged in finger posturing and fidgeted with his pant leg while discussing his distress.    The supervising psychologist provided support and validated Lenin's feelings. She encouraged him to continue therapy with his campus provider. Lenin and the supervising psychologist also discussed " transitioning to an adult provider, which Lenin felt was appropriate at this time.      Assessment: Lenin was appropriately dressed and well-groomed for the appointment. Attitude was cooperative and responses to questions were meaningful and clear. Mood was positive though overwhelmed. Affect was congruent, and when discussing being overwhelmed, Lenin teared up. Thought processes logical and goal directed. Lenin will likely benefit from continued therapy with his licensed mental health provider.     Plan: Lenin will transfer to an adult provider.    Isabel Lane MA  Pre-doctoral Intern  Pediatric Psychology Program  Department of Pediatrics    Patrizia Carolina, PhD, LP, BCBA-D    of Pediatrics   Board Certified Behavior Analyst-Doctoral   Department of Pediatrics   *no letter     The author of this note documented a reason for not sharing it with the patient.

## 2025-06-05 NOTE — LETTER
2025      RE: Abel Flores  [address]  [place] [address]     Dear Colleague,    Thank you for the opportunity to participate in the care of your patient, Abel Flores, at the [facility] at [facility]. Please see a copy of my visit note below.          Date: 2025    PATIENT:  Abel Flores  :          2005  KARTHIK:          2025    Dear Carrol Hitchcock:    I had the pleasure of seeing your patient, Abel Flores, for a visit in the [facility] on 2025 at the [facility]. Please see below for my assessment and plan of care.    As you may recall, Lenin is a 19 year old male with a BMI in the severe obesity range (defined as BMI >/ 35 kg/m2) complicated by type 2 diabetes, hepatic steatosis, and elevated BP.  He underwent laparoscopic sleeve gastrectomy on 2024 and presents today for a routine post-operative appointment.      Intercurrent History:   Lenin felt as though weight loss was relatively simple after the surgery.  He also now feels as though his weight loss has plateaued and if he wanted to lose more weight he would have to put more effort forward compared to after the surgery initially.  Lenin has noticed he feels hungrier and is able to eat more lately.  Lenin says home measures have varied from 203 to 210 for the last 3 months.    ROS:   Fevers: none   Abdominal pain: occasionally feelings of fullness with overeating (~3x/week), no pain otherwise   Nausea/vomiting: none   Constipation: none   Diarrhea: none   Dysphagia: none  Lightheadedness/dizziness: when standing if forgetting to take vitamins once every 2 weeks, per Lenin feels based on what foods Lenin has eaten that day  Food intolerance: none   Shortness of breath: none   Chest pain: none  Heartburn: none        Medications:  - melatonin  - vitamins + calcium     Activity:  - used to powerlift prior to the surgery, focuses more on hiking, walking, backpacking now daily 4-5 miles  - interested in doing more weight lifting to regain muscle mass lost after surgery  - feels like he can run much faster and further than he ever used to but does endorse feeling weaker now     Mental Health  - College has been stressful  - Sees a therapist in [place], [place] biweekly for 6 weeks thus far, feels it is helpful  - feels as though food used to have emotionally beneficial effect when he could eat more     Social History:   - Lenin finished first year [employer] - major in professional communication and 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    Current Medications:  Current Outpatient Rx   Medication Sig Dispense Refill     acetaminophen (TYLENOL) 325 MG tablet Take 2 tablets (650 mg) by mouth every 4 hours as needed for other (For optimal non-opioid multimodal pain management to improve pain control and physical function.)       Calcium-Vitamin D 600-5 MG-MCG TABS Take 1 tablet by mouth 2 times daily Hold for 1 month postop       Melatonin 10 MG CAPS Take 10 mg by mouth nightly as needed (sleep)       multivitamin w/minerals (MULTI-VITAMIN) tablet Take 2 tablets by mouth 2 times daily Hold for 1 month postop         Physical Exam:    Vitals:    B/P:   BP Readings from Last 1 Encounters:   06/05/25 118/78     P:   Pulse Readings from Last 1 Encounters:   06/05/25 67       Measured Weights:  Wt Readings from Last 4 Encounters:   06/05/25 95.6 kg (210 lb 12.2 oz)   08/01/24 111.4 kg (245 lb 9.5 oz)   06/06/24 123.8 kg (272 lb 14.9 oz) (>99%, Z= 2.72)*   05/23/24 127.6 kg (281 lb 4.8 oz) (>99%, Z= 2.84)*     * Growth percentiles are based on CDC (Boys, 2-20 Years) data.       Height:    Ht Readings from Last 4 Encounters:  "  06/05/25 1.878 m (6' 1.94\")   08/01/24 1.872 m (6' 1.7\")   06/06/24 1.855 m (6' 1.03\") (89%, Z= 1.22)*   05/23/24 1.854 m (6' 1\") (89%, Z= 1.21)*     * Growth percentiles are based on CDC (Boys, 2-20 Years) data.     Body Mass Index:  Body mass index is 27.11 kg/m .    Labs:    No results found for any visits on 06/05/25.  Results for orders placed or performed in visit on 06/05/25   Zinc     Status: None   Result Value Ref Range    Zinc, Serum/Plasma 75.4 60.0 - 120.0 ug/dL   Iron & Iron Binding Capacity     Status: Abnormal   Result Value Ref Range    Iron 54 (L) 61 - 157 ug/dL    Iron Binding Capacity 358 240 - 430 ug/dL    Iron Sat Index 15 15 - 46 %   Ferritin     Status: Normal   Result Value Ref Range    Ferritin 45 31 - 409 ng/mL   Vitamin D Deficiency     Status: Normal   Result Value Ref Range    Vitamin D, Total (25-Hydroxy) 26 20 - 50 ng/mL    Narrative    Season, race, dietary intake, and treatment affect the concentration of 25-hydroxy-Vitamin D. Values may decrease during winter months and increase during summer months.    Vitamin D determination is routinely performed by an immunoassay specific for 25 hydroxyvitamin D3.  If an individual is on vitamin D2(ergocalciferol) supplementation, please specify 25 OH vitamin D2 and D3 level determination by LCMSMS test VITD23.     Folate     Status: Normal   Result Value Ref Range    Folic Acid 9.4 4.6 - 34.8 ng/mL   Vitamin B12     Status: Normal   Result Value Ref Range    Vitamin B12 467 232 - 1,245 pg/mL   Parathyroid Hormone Intact     Status: Normal   Result Value Ref Range    Parathyroid Hormone Intact 32 15 - 65 pg/mL    Narrative    This result was obtained with the Roche Elecsys PTH STAT assay.   This reference range differs from PTH assays used in other [facility] laboratories.   Magnesium     Status: Normal   Result Value Ref Range    Magnesium 2.1 1.7 - 2.3 mg/dL   Comprehensive metabolic panel     Status: None   Result Value Ref Range    " Sodium 141 135 - 145 mmol/L    Potassium 4.0 3.4 - 5.3 mmol/L    Carbon Dioxide (CO2) 28 22 - 29 mmol/L    Anion Gap 10 7 - 15 mmol/L    Urea Nitrogen 15.7 6.0 - 20.0 mg/dL    Creatinine 0.74 0.67 - 1.17 mg/dL    GFR Estimate >90 >60 mL/min/1.73m2    Calcium 9.5 8.8 - 10.4 mg/dL    Chloride 103 98 - 107 mmol/L    Glucose 93 70 - 99 mg/dL    Alkaline Phosphatase 73 40 - 150 U/L    AST 15 0 - 45 U/L    ALT 9 0 - 70 U/L    Protein Total 7.6 6.4 - 8.3 g/dL    Albumin 4.6 3.5 - 5.2 g/dL    Bilirubin Total 0.8 <=1.2 mg/dL    Patient Fasting > 8hrs? No    Results for orders placed or performed in visit on 06/05/25   Phosphorus     Status: Normal   Result Value Ref Range    Phosphorus 3.4 2.5 - 4.5 mg/dL   Lipid Profile     Status: None   Result Value Ref Range    Cholesterol 133 <200 mg/dL    Triglycerides 55 <150 mg/dL    Direct Measure HDL 58 >=40 mg/dL    LDL Cholesterol Calculated 64 <100 mg/dL    Non HDL Cholesterol 75 <130 mg/dL    Patient Fasting > 8hrs? No     Narrative    Cholesterol  Desirable: < 200 mg/dL  Borderline High: 200 - 239 mg/dL  High: >= 240 mg/dL    Triglycerides  Normal: < 150 mg/dL  Borderline High: 150 - 199 mg/dL  High: 200-499 mg/dL  Very High: >= 500 mg/dL    Direct Measure HDL  Female: >= 50 mg/dL   Male: >= 40 mg/dL    LDL Cholesterol  Desirable: < 100 mg/dL  Above Desirable: 100 - 129 mg/dL   Borderline High: 130 - 159 mg/dL   High:  160 - 189 mg/dL   Very High: >= 190 mg/dL    Non HDL Cholesterol  Desirable: < 130 mg/dL  Above Desirable: 130 - 159 mg/dL  Borderline High: 160 - 189 mg/dL  High: 190 - 219 mg/dL  Very High: >= 220 mg/dL   Hemoglobin A1c     Status: Normal   Result Value Ref Range    Estimated Average Glucose 111 <117 mg/dL    Hemoglobin A1C 5.5 <5.7 %   CBC with platelets and differential     Status: None   Result Value Ref Range    WBC Count 8.5 4.0 - 11.0 10e3/uL    RBC Count 4.80 4.40 - 5.90 10e6/uL    Hemoglobin 14.3 13.3 - 17.7 g/dL    Hematocrit 43.8 40.0 - 53.0 %    MCV  "91 78 - 100 fL    MCH 29.8 26.5 - 33.0 pg    MCHC 32.6 31.5 - 36.5 g/dL    RDW 13.2 10.0 - 15.0 %    Platelet Count 296 150 - 450 10e3/uL    % Neutrophils 66 %    % Lymphocytes 24 %    % Monocytes 8 %    % Eosinophils 1 %    % Basophils 0 %    % Immature Granulocytes 0 %    NRBCs per 100 WBC 0 <1 /100    Absolute Neutrophils 5.6 1.6 - 8.3 10e3/uL    Absolute Lymphocytes 2.1 0.8 - 5.3 10e3/uL    Absolute Monocytes 0.7 0.0 - 1.3 10e3/uL    Absolute Eosinophils 0.1 0.0 - 0.7 10e3/uL    Absolute Basophils 0.0 0.0 - 0.2 10e3/uL    Absolute Immature Granulocytes 0.0 <=0.4 10e3/uL    Absolute NRBCs 0.0 10e3/uL   CBC with Platelets & Differential     Status: None    Narrative    The following orders were created for panel order CBC with Platelets & Differential.  Procedure                               Abnormality         Status                     ---------                               -----------         ------                     CBC with platelets and ...[[id number]]                      Final result                 Please view results for these tests on the individual orders.          Assessment:  Abel \"Lenin\" is a 18 year old with class 3 obesity complicated by type 2 diabetes, hepatic steatosis, and elevated BP.  Lenin is now s/p LSG done 5/14/2024 and presents today for a routine post-op visit. Today, BMI is decreased to 27.11 kg/m2 from 31.79 kg/m2 ten months prior.  Additionally, Lenin feels as though his general weight loss trend has plateaued but does not feel as though it is difficult to maintain on lifestyle monotherapy at this time.  Today, we opted to recheck nutritional deficiencies and discussed future cares could take place at an adult bariatric clinic to which Lenin was agreeable.     Abel s current problem list reviewed today includes:    Encounter Diagnoses   Name Primary?     Status post bariatric surgery      Class 3 obesity (H) Yes       Care Plan:  Severe Obesity: BMI today 27.11, down from peak " 37.11    - RD and psychology appointments today as part of bariatric clinic   - Continue routine post-operative cares, including daily supplements   - Routine post-operative labs drawn today     Type 2 Diabetes: Hgb A1c 5.3%; [facility]    - In remission - recheck Hgb A1c with post-op labs      Hepatic Steatosis: GI consultation done; biopsy not done   - ALT normalized prior to surgery (35 U/L)  - Last US: 2/15/2023      Elevated BP  - Normalized    We have enjoyed having Lenin in our clinic thus far, and recommend Lnein continue cares in the adult bariatric clinics.    Thank you for including me in the care of your patient.  Please do not hesitate to call with questions or concerns.    Sincerely,    Ye Wheatley DO, MS  Pediatric Weight Management  Department of Pediatrics  [facility]      Physician Attestation  I, Ana Rosa Akins MD, saw this patient and agree with the findings and plan of care as documented in the note.      Items personally reviewed/procedural attestation: vitals, labs, and history.    In speaking with Lenin, he reports that he has been taking his multi-vitamins 3-4x/week. He explains that he has to take them with food otherwise he'll feel sick. Because of that, he would miss taking them if he forgot to bring them with him to the cafeteria. He feels that this will be better now that he's living in an apartment for the summer.     Overall, Lenin is proud of the physical progress that he has made but identifies that he is now focused on his mental health journey. He is tearful in describing addressing his mental health but is seeking care.     Weight on day of surgery was 293 lbs and weight today is 210 lbs. This reflects a 83 lb (28.3%) weight loss since surgery. Labs obtained today are, overall, good. Iron level is low but hemoglobin is within normal limits. ALT and hemoglobin A1c remain normal. Recommend more consistent dosing with multi-vitamin with  iron. Also discussed transition to adult care and Lenin is open to this. There are options in [place] that should be easier for him in terms of location. I am more than happy to see him back in this clinic whenever needed.     Review of the result(s) of each unique test - labs noted above  Ordering of each unique test  Management of stable chronic health condition - obesity s/p LSG     Ana Rosa Akins MD, MS   American Board of Obesity Medicine Diplomate      Department of Pediatrics   [facility]             CC  Copy to patient  Huong Flores Frank  [address]  [place] [address]    Please do not hesitate to contact me if you have any questions/concerns.     Sincerely,       Ana Rosa Akins MD

## 2025-06-05 NOTE — NURSING NOTE
"Wt Readings from Last 4 Encounters:   06/05/25 210 lb 12.2 oz (95.6 kg)   08/01/24 245 lb 9.5 oz (111.4 kg)   06/06/24 272 lb 14.9 oz (123.8 kg) (>99%, Z= 2.72)*   05/23/24 281 lb 4.8 oz (127.6 kg) (>99%, Z= 2.84)*     * Growth percentiles are based on CDC (Boys, 2-20 Years) data.   Peds Outpatient BP  1) Rested for 5 minutes, BP taken on bare arm, patient sitting (or supine for infants) w/ legs uncrossed?   Yes  2) Right arm used?      Yes  3) Arm circumference of largest part of upper arm (in cm): 35.2cm  4) BP cuff sized used: Large Adult (32-43cm)   If used different size cuff then what was recommended why? N/A  5) First BP reading:machine   BP Readings from Last 1 Encounters:   06/05/25 118/78      Is reading >90%?No   (90% for <1 years is 90/50)  (90% for >18 years is 140/90)  *If a machine BP is at or above 90% take manual BP  6) Manual BP reading: N/A  7) Other comments: None.discon  [facility] [[id number]]  Chief Complaint   Patient presents with    RECHECK     Bariatric surgery follow up     Initial /78   Pulse 67   Ht 6' 1.94\" (187.8 cm)   Wt 210 lb 12.2 oz (95.6 kg)   BMI 27.11 kg/m   Estimated body mass index is 27.11 kg/m  as calculated from the following:    Height as of this encounter: 6' 1.94\" (187.8 cm).    Weight as of this encounter: 210 lb 12.2 oz (95.6 kg).  Medication Reconciliation: complete    Does the patient need any medication refills today? N/A    Does the patient/parent have MyChart set up? Yes   Proxy access needed? No    Is the patient 18 or turning 18 in the next 2 months? Yes   If yes, make sure they have a Consent To Communicate on file    Lily Andrew LPN.      "

## 2025-06-06 LAB
BASOPHILS # BLD AUTO: 0 10E3/UL (ref 0–0.2)
BASOPHILS NFR BLD AUTO: 0 %
EOSINOPHIL # BLD AUTO: 0.1 10E3/UL (ref 0–0.7)
EOSINOPHIL NFR BLD AUTO: 1 %
ERYTHROCYTE [DISTWIDTH] IN BLOOD BY AUTOMATED COUNT: 13.2 % (ref 10–15)
HCT VFR BLD AUTO: 43.8 % (ref 40–53)
HGB BLD-MCNC: 14.3 G/DL (ref 13.3–17.7)
IMM GRANULOCYTES # BLD: 0 10E3/UL
IMM GRANULOCYTES NFR BLD: 0 %
LYMPHOCYTES # BLD AUTO: 2.1 10E3/UL (ref 0.8–5.3)
LYMPHOCYTES NFR BLD AUTO: 24 %
MCH RBC QN AUTO: 29.8 PG (ref 26.5–33)
MCHC RBC AUTO-ENTMCNC: 32.6 G/DL (ref 31.5–36.5)
MCV RBC AUTO: 91 FL (ref 78–100)
MONOCYTES # BLD AUTO: 0.7 10E3/UL (ref 0–1.3)
MONOCYTES NFR BLD AUTO: 8 %
NEUTROPHILS # BLD AUTO: 5.6 10E3/UL (ref 1.6–8.3)
NEUTROPHILS NFR BLD AUTO: 66 %
NRBC # BLD AUTO: 0 10E3/UL
NRBC BLD AUTO-RTO: 0 /100
PLATELET # BLD AUTO: 296 10E3/UL (ref 150–450)
RBC # BLD AUTO: 4.8 10E6/UL (ref 4.4–5.9)
WBC # BLD AUTO: 8.5 10E3/UL (ref 4–11)

## 2025-06-07 LAB — ZINC SERPL-MCNC: 75.4 UG/DL

## 2025-06-09 LAB
ANNOTATION COMMENT IMP: NORMAL
PYRIDOXAL PHOS SERPL-SCNC: 93.1 NMOL/L
RETINYL PALMITATE SERPL-MCNC: <0.02 MG/L
VIT A SERPL-MCNC: 0.44 MG/L

## 2025-06-10 LAB — VIT B1 PYROPHOSHATE BLD-SCNC: 133 NMOL/L

## (undated) DEVICE — STPL SKIN 35W ROTATING HEAD PRW35

## (undated) DEVICE — CLIP APPLIER ENDO 5MM M/L LIGAMAX EL5ML

## (undated) DEVICE — ENDO TROCAR OPTICAL ACCESS KII Z-THRD 15X100MM C0R37

## (undated) DEVICE — TUBING SMOKE EVAC PNEUMOCLEAR HIGH FLOW 0620050250

## (undated) DEVICE — DRSG PRIMAPORE 02X3" 7133

## (undated) DEVICE — NDL SPINAL 22GA 3.5" QUINCKE 405181

## (undated) DEVICE — STPL POWERED ECHELON LONG 60MM PLEE60A

## (undated) DEVICE — ANTIFOG SOLUTION W/FOAM PAD 31142527

## (undated) DEVICE — LINEN TOWEL PACK X5 5464

## (undated) DEVICE — Device

## (undated) DEVICE — SOL WATER IRRIG 1000ML BOTTLE 2F7114

## (undated) DEVICE — PREP CHLORAPREP 26ML TINTED HI-LITE ORANGE 930815

## (undated) DEVICE — SUCTION MANIFOLD NEPTUNE 2 SYS 4 PORT 0702-020-000

## (undated) DEVICE — ENDO POUCH UNIVERSAL RETRIEVAL SYSTEM INZII 12/15MM CD004

## (undated) DEVICE — ENDO TROCAR FIRST ENTRY KII FIOS ADV FIX 05X100MM CFF03

## (undated) DEVICE — STPL RELOAD REG TISSUE ECHELON 60 X 3.6MM BLUE GST60B

## (undated) DEVICE — ESU GROUND PAD ADULT W/CORD E7507

## (undated) DEVICE — COVER CAMERA IN-LIGHT LENS LT-C02-P

## (undated) DEVICE — GLOVE BIOGEL PI ULTRATOUCH SZ 7.5 41175

## (undated) DEVICE — SYR 30ML LL W/O NDL 302832

## (undated) DEVICE — NDL INSUFFLATION 13GA 150MM C2202

## (undated) DEVICE — SYSTEM CALIBRATION GASTRECTOMY SLEEVE VISIGI 3D 40FR 5240

## (undated) DEVICE — LINEN TOWEL PACK X6 WHITE 5487

## (undated) DEVICE — ESU LIGASURE MARYLAND VESSEL LAP 44CM XLONG LF1944

## (undated) DEVICE — ENDO TROCAR SLEEVE KII ADV FIXATION 05X100MM CFS02

## (undated) RX ORDER — SCOLOPAMINE TRANSDERMAL SYSTEM 1 MG/1
PATCH, EXTENDED RELEASE TRANSDERMAL
Status: DISPENSED
Start: 2024-05-14

## (undated) RX ORDER — FENTANYL CITRATE-0.9 % NACL/PF 10 MCG/ML
PLASTIC BAG, INJECTION (ML) INTRAVENOUS
Status: DISPENSED
Start: 2024-05-14

## (undated) RX ORDER — ONDANSETRON 2 MG/ML
INJECTION INTRAMUSCULAR; INTRAVENOUS
Status: DISPENSED
Start: 2024-05-14

## (undated) RX ORDER — DEXAMETHASONE SODIUM PHOSPHATE 4 MG/ML
INJECTION, SOLUTION INTRA-ARTICULAR; INTRALESIONAL; INTRAMUSCULAR; INTRAVENOUS; SOFT TISSUE
Status: DISPENSED
Start: 2024-05-14

## (undated) RX ORDER — APREPITANT 40 MG/1
CAPSULE ORAL
Status: DISPENSED
Start: 2024-05-14

## (undated) RX ORDER — ACETAMINOPHEN 325 MG/1
TABLET ORAL
Status: DISPENSED
Start: 2024-05-14

## (undated) RX ORDER — FENTANYL CITRATE 50 UG/ML
INJECTION, SOLUTION INTRAMUSCULAR; INTRAVENOUS
Status: DISPENSED
Start: 2024-05-14

## (undated) RX ORDER — SODIUM CHLORIDE, SODIUM LACTATE, POTASSIUM CHLORIDE, CALCIUM CHLORIDE 600; 310; 30; 20 MG/100ML; MG/100ML; MG/100ML; MG/100ML
INJECTION, SOLUTION INTRAVENOUS
Status: DISPENSED
Start: 2024-05-14

## (undated) RX ORDER — PROPOFOL 10 MG/ML
INJECTION, EMULSION INTRAVENOUS
Status: DISPENSED
Start: 2024-05-14

## (undated) RX ORDER — ENOXAPARIN SODIUM 100 MG/ML
INJECTION SUBCUTANEOUS
Status: DISPENSED
Start: 2024-05-14

## (undated) RX ORDER — CEFAZOLIN SODIUM/WATER 3 G/30 ML
SYRINGE (ML) INTRAVENOUS
Status: DISPENSED
Start: 2024-05-14

## (undated) RX ORDER — BUPIVACAINE HYDROCHLORIDE 2.5 MG/ML
INJECTION, SOLUTION EPIDURAL; INFILTRATION; INTRACAUDAL
Status: DISPENSED
Start: 2024-05-14